# Patient Record
Sex: MALE | Race: WHITE | NOT HISPANIC OR LATINO | Employment: FULL TIME | URBAN - METROPOLITAN AREA
[De-identification: names, ages, dates, MRNs, and addresses within clinical notes are randomized per-mention and may not be internally consistent; named-entity substitution may affect disease eponyms.]

---

## 2018-02-06 ENCOUNTER — TELEPHONE (OUTPATIENT)
Dept: CARDIOLOGY | Facility: MEDICAL CENTER | Age: 68
End: 2018-02-06

## 2018-02-06 ENCOUNTER — TELEPHONE (OUTPATIENT)
Dept: CARDIOLOGY | Facility: PHYSICIAN GROUP | Age: 68
End: 2018-02-06

## 2018-02-06 DIAGNOSIS — Z95.1 S/P CABG X 3: Chronic | ICD-10-CM

## 2018-02-06 NOTE — TELEPHONE ENCOUNTER
I spoke with Pts son today in an appointment, they would both benefit from ICR.  Please call the patient and let him know he has a referral, he should also come in for follow up yearly if he would like    Thank you

## 2018-02-06 NOTE — TELEPHONE ENCOUNTER
Deon Gibson M.D. 8 minutes ago (2:53 PM)   Patient's wife taking his calls given this information.     I spoke with Pts son today in an appointment, they would both benefit from ICR.  Please call the patient and let him know he has a referral, he should also come in for follow up yearly if he would like     Thank you            Documentation

## 2018-03-22 ENCOUNTER — HOSPITAL ENCOUNTER (OUTPATIENT)
Dept: HOSPITAL 8 - ROC | Age: 68
End: 2018-03-22
Attending: RADIOLOGY
Payer: COMMERCIAL

## 2018-03-22 DIAGNOSIS — Z02.9: Primary | ICD-10-CM

## 2018-03-26 ENCOUNTER — HOSPITAL ENCOUNTER (OUTPATIENT)
Dept: HOSPITAL 8 - ROC | Age: 68
Discharge: HOME | End: 2018-03-26
Attending: RADIOLOGY
Payer: COMMERCIAL

## 2018-03-26 DIAGNOSIS — C02.9: Primary | ICD-10-CM

## 2018-03-26 PROCEDURE — G0463 HOSPITAL OUTPT CLINIC VISIT: HCPCS

## 2018-03-26 PROCEDURE — 99204 OFFICE O/P NEW MOD 45 MIN: CPT

## 2018-04-05 ENCOUNTER — HOSPITAL ENCOUNTER (OUTPATIENT)
Dept: HOSPITAL 8 - PETCFH | Age: 68
Discharge: HOME | End: 2018-04-05
Attending: RADIOLOGY
Payer: COMMERCIAL

## 2018-04-05 DIAGNOSIS — C02.2: Primary | ICD-10-CM

## 2018-04-05 PROCEDURE — A9552 F18 FDG: HCPCS

## 2018-04-05 PROCEDURE — 78815 PET IMAGE W/CT SKULL-THIGH: CPT

## 2019-03-18 ENCOUNTER — OFFICE VISIT (OUTPATIENT)
Dept: CARDIOLOGY | Facility: MEDICAL CENTER | Age: 69
End: 2019-03-18
Payer: COMMERCIAL

## 2019-03-18 VITALS
DIASTOLIC BLOOD PRESSURE: 72 MMHG | WEIGHT: 294 LBS | HEIGHT: 72 IN | BODY MASS INDEX: 39.82 KG/M2 | SYSTOLIC BLOOD PRESSURE: 140 MMHG | OXYGEN SATURATION: 92 % | HEART RATE: 56 BPM

## 2019-03-18 DIAGNOSIS — I25.10 CORONARY ARTERY DISEASE DUE TO CALCIFIED CORONARY LESION: ICD-10-CM

## 2019-03-18 DIAGNOSIS — I44.7 LBBB (LEFT BUNDLE BRANCH BLOCK): Chronic | ICD-10-CM

## 2019-03-18 DIAGNOSIS — Z95.1 S/P CABG X 3: Chronic | ICD-10-CM

## 2019-03-18 DIAGNOSIS — R00.2 PALPITATIONS: ICD-10-CM

## 2019-03-18 DIAGNOSIS — I10 ESSENTIAL HYPERTENSION: ICD-10-CM

## 2019-03-18 DIAGNOSIS — E78.5 DYSLIPIDEMIA: Chronic | ICD-10-CM

## 2019-03-18 DIAGNOSIS — I48.0 PAROXYSMAL ATRIAL FIBRILLATION (HCC): Chronic | ICD-10-CM

## 2019-03-18 DIAGNOSIS — I25.84 CORONARY ARTERY DISEASE DUE TO CALCIFIED CORONARY LESION: ICD-10-CM

## 2019-03-18 PROCEDURE — 99214 OFFICE O/P EST MOD 30 MIN: CPT | Performed by: INTERNAL MEDICINE

## 2019-03-18 RX ORDER — HYDROCODONE BITARTRATE AND ACETAMINOPHEN 5; 325 MG/1; MG/1
TABLET ORAL
COMMUNITY
Start: 2018-02-12 | End: 2019-03-18

## 2019-03-18 RX ORDER — ASPIRIN 81 MG/1
81 TABLET ORAL DAILY
COMMUNITY
Start: 2016-05-19 | End: 2022-06-01

## 2019-03-18 RX ORDER — CEFDINIR 300 MG/1
CAPSULE ORAL
COMMUNITY
Start: 2018-02-11 | End: 2019-03-18

## 2019-03-18 ASSESSMENT — ENCOUNTER SYMPTOMS
SORE THROAT: 0
PALPITATIONS: 0
PND: 0
FALLS: 0
BLURRED VISION: 0
SHORTNESS OF BREATH: 0
CLAUDICATION: 0
FOCAL WEAKNESS: 0
NAUSEA: 0
BRUISES/BLEEDS EASILY: 0
COUGH: 0
CHILLS: 0
FEVER: 0
DIZZINESS: 0
WEAKNESS: 0
ABDOMINAL PAIN: 0

## 2019-03-18 NOTE — LETTER
March 18, 2019       Patient: Ramon Aevry   YOB: 1950   Date of Visit: 3/18/2019         To Whom It May Concern:    It is my medical opinion that Ramon Avery return to full duty, no restrictions.    If you have any questions or concerns, please don't hesitate to call [phone]          Sincerely,          Deon Gibson M.D.  Electronically Signed

## 2019-03-18 NOTE — PATIENT INSTRUCTIONS
Please look into the following diets and incorporate them into your diet    LOW SALT DIET   KEEP YOUR SODIUM EQUAL TO CALORIES AND NO MORE THAN DOUBLE THE CALORIES FOR A LOW SALT DIET    FOR TREATMENT OR PREVENTION OF CORONARY ARTERY DISEASE    [place] - [facility]    Dr[facility] for Reversing Heart Disease - Wilian Arevalo's Cardiologist    [facility]    Dr Barnes - [place] [facility] (book and documentary)      FOR TREATMENT OF BLOOD PRESSURE  DASH DIET - American Heart Association for treatment of HYPERTENSION    FOR TREATMENT OF BAD CHOLESTEROL/FATS  REDUCE PROCESSED SUGAR AS MUCH AS POSSIBLE  INCREASE WHOLE GRAINS/VEGETABLES    Lowering total cholesterol and LDL (bad) cholesterol:  - Eat leaner cuts of meat, or eliminate altogether if possible red meat, and frequently substitute fish or chicken.  - Limit saturated fat to no more than 7-10% of total calories no more than 10 g per day is recommended. Some sources of saturated fat include butter, animal fats, hydrogenated vegetable fats and oils, many desserts, whole milk dairy products.  - Replaced saturated fats with polyunsaturated fats and monounsaturated fats. Foods high in monounsaturated fat include nuts, although well, canola oil, avocados, and olives.  - Limit trans fat (processed foods) and replaced with fresh fruits and vegetables  - Recommend nonfat dairy products  - Increase substantially the amount of soluble fiber intake (legumes such as beans, fruit, whole grains).  - Consider nutritional supplements: plant sterile spreads such as Benecol, fish oil,  flaxseed oil, omega-3 acids capsules 1000 mg twice a day, or viscous fiber such as Metamucil  - Attain ideal weight and regular exercise (at least 30 minutes per day of walking)    Lowering triglycerides:  - Reduce intake of simple sugar: Desserts, candy, pastries, honey, sodas, sugared cereals, yogurt, Gatorade, sports bars, canned  fruit, smoothies, fruit juice, coffee drinks  - Reduced intake of refined starches: Refined Pasta  - Reduce or abstain from alcohol  - Increase omega-3 fatty acids: [place], Trout, Mackerel, Herring, [place] tuna and supplements  - Attain ideal weight and regular exercise (at least 30 minutes per day of walking)      Elevating HDL (good) cholesterol:  - Increase physical activity  - Seasoned foods with garlic and onions  - Increase omega-3 fatty acids and supplements as listed above  - Incorporating appropriate amounts of monounsaturated fats such as nuts, olive oil, canola oil, avocados, olives  - Stop smoking  - Attain ideal weight and regular exercise (at least 30 minutes per day of walking)

## 2019-03-18 NOTE — PROGRESS NOTES
"Chief Complaint   Patient presents with   • Coronary Artery Bypass Graft (CABG)     Follow up   • Hypertension       Subjective:   Ramon Avery is a 68 y.o. male who presents today for follow-up of his history of CABG which was complicated by postoperative atrial fibrillation    He describes a couple episodes of near syncope one when he was shopping and while pushing the cart he felt that he was out of sorts for up to seconds and then this resolved he had another shorter episode after this    He has had issues in the past with his hearing aids    Past Medical History:   Diagnosis Date   • Anesthesia     \"difficult to put to sleep\"   • Anginal syndrome (HCC)     CAD   • Arthritis     upper back   • Dependence on supplemental oxygen    • Heart burn    • High cholesterol    • Hypertension    • Obesity    • Paroxysmal atrial fibrillation (HCC)    • Paroxysmal atrial fibrillation - postoperative from CABG    • S/P CABG x 3 5/2016 Dr Ryder for 3 vessel disease including left main aneurysm      Past Surgical History:   Procedure Laterality Date   • OTHER CARDIAC SURGERY  5/12/16    CABG   • MULTIPLE CORONARY ARTERY BYPASS ENDO VEIN HARVEST  5/12/2016    Procedure: MULTIPLE CORONARY ARTERY BYPASS X3,  ENDO VEIN HARVEST , SYLVIA AND PREVENA DRESSING ;  Surgeon: Bob Ryder M.D.;  Location: SURGERY [facility];  Service:    • RECOVERY  5/9/2016    Procedure: CATH LAB [facility] WITH POSSIBLE DR. HE;  Surgeon: [facility];  Location: SURGERY PRE-POST PROC UNIT [facility];  Service:    • OTHER  1990    Circumcision     History reviewed. No pertinent family history.  Social History     Social History   • Marital status:      Spouse name: N/A   • Number of children: N/A   • Years of education: N/A     Occupational History   • Not on file.     Social History Main Topics   • Smoking status: Never Smoker   • Smokeless tobacco: Never Used   • Alcohol use No   • Drug use: No   • Sexual activity: Not on file " "    Other Topics Concern   • Not on file     Social History Narrative   • No narrative on file     Allergies   Allergen Reactions   • [place] Swelling     Lip swelling. Not anaphylactic.     Outpatient Encounter Prescriptions as of 3/18/2019   Medication Sig Dispense Refill   • Lactobacillus (ACIDOPHILUS PROBIOTIC) Tab TK 1 T PO  QD FOR 14 DAYS     • MAGNESIUM PO Take 30 mg by mouth.     • aspirin 81 MG EC tablet Take 81 mg by mouth.     • [DISCONTINUED] cefdinir (OMNICEF) 300 MG Cap      • [DISCONTINUED] HYDROcodone-acetaminophen (NORCO) 5-325 MG Tab per tablet      • [DISCONTINUED] aspirin EC 81 MG EC tablet Take 1 Tab by mouth every day. 30 Tab      No facility-administered encounter medications on file as of 3/18/2019.      Review of Systems   Constitutional: Negative for chills and fever.   HENT: Negative for sore throat.    Eyes: Negative for blurred vision.   Respiratory: Negative for cough and shortness of breath.    Cardiovascular: Negative for chest pain, palpitations, claudication, leg swelling and PND.   Gastrointestinal: Negative for abdominal pain and nausea.   Musculoskeletal: Negative for falls and joint pain.   Skin: Negative for rash.   Neurological: Negative for dizziness, focal weakness and weakness.   Endo/Heme/Allergies: Does not bruise/bleed easily.        Objective:   /72 (BP Location: Right arm, Patient Position: Sitting)   Pulse (!) 56   Ht 1.829 m (6' 0.01\")   Wt (!) 133.4 kg (294 lb)   SpO2 92%   BMI 39.86 kg/m²     Physical Exam   Constitutional: No distress.   HENT:   Mouth/Throat: Oropharynx is clear and moist. No oropharyngeal exudate.   Eyes: No scleral icterus.   Neck: No JVD present.   Cardiovascular: Normal rate and normal heart sounds.  Exam reveals no gallop and no friction rub.    No murmur heard.  Pulmonary/Chest: No respiratory distress. He has no wheezes. He has no rales.   Abdominal: Soft. Bowel sounds are normal.   Musculoskeletal: He exhibits no edema. "   Neurological: He is alert.   Skin: No rash noted. He is not diaphoretic.   Psychiatric: He has a normal mood and affect.     We reviewed his labs from lab core which were normal aside from glucose of 106    Previous LDL was improved from prior but still with dyslipidemia  Assessment:     1. LBBB (left bundle branch block)     2. S/P CABG x 3 5/2016 Dr Ryder for 3 vessel disease including left main aneurysm     3. Essential hypertension     4. Dyslipidemia     5. Palpitations  Holter Monitor / Event Recorder   6. Paroxysmal atrial fibrillation - postoperative from CABG     7. Coronary artery disease due to calcified coronary lesion         Medical Decision Making:  Today's Assessment / Status / Plan:       It was my pleasure to meet with Mr. Avery.    He is accompanied by his son    Blood pressure is well controlled.      He declined statins or other blood pressure agents    For his episode of near syncope this certainly could be arrhythmia he did have postoperative atrial fibrillation so we will start with a longer-term event monitor to sort this out it could be also in her ear issue so he will follow-up with ENT    I will see Mr. Avery back in 1 year time and encouraged him to follow up with us over the phone or e-mail using my MyChart as issues arise.    It is my pleasure to participate in the care of Mr. Avery.  Please do not hesitate to contact me with questions or concerns.    Deon Gibson MD PhD [facility]  Cardiologist [facility]    Please note that this dictation was created using voice recognition software. I have worked with consultants from the vendor as well as technical experts from [facility] to optimize the interface. I have made every reasonable attempt to correct obvious errors, but I expect that there are errors of grammar and possibly content I did not discover before finalizing the note.

## 2019-03-18 NOTE — LETTER
"     [facility]   [address]   [address]  [place] [address]  Phone: [phone]  Fax: [phone]              Ramon Avery  1950    Encounter Date: 3/18/2019    Deon Gibson M.D.          PROGRESS NOTE:  Chief Complaint   Patient presents with   • Coronary Artery Bypass Graft (CABG)     Follow up   • Hypertension       Subjective:   Ramon Avery is a 68 y.o. male who presents today for follow-up of his history of CABG which was complicated by postoperative atrial fibrillation    He describes a couple episodes of near syncope one when he was shopping and while pushing the cart he felt that he was out of sorts for up to seconds and then this resolved he had another shorter episode after this    He has had issues in the past with his hearing aids    Past Medical History:   Diagnosis Date   • Anesthesia     \"difficult to put to sleep\"   • Anginal syndrome (HCC)     CAD   • Arthritis     upper back   • Dependence on supplemental oxygen    • Heart burn    • High cholesterol    • Hypertension    • Obesity    • Paroxysmal atrial fibrillation (HCC)    • Paroxysmal atrial fibrillation - postoperative from CABG    • S/P CABG x 3 5/2016 Dr Ryder for 3 vessel disease including left main aneurysm      Past Surgical History:   Procedure Laterality Date   • OTHER CARDIAC SURGERY  5/12/16    CABG   • MULTIPLE CORONARY ARTERY BYPASS ENDO VEIN HARVEST  5/12/2016    Procedure: MULTIPLE CORONARY ARTERY BYPASS X3,  ENDO VEIN HARVEST , SYLVIA AND PREVENA DRESSING ;  Surgeon: Bob Ryder M.D.;  Location: SURGERY [facility];  Service:    • RECOVERY  5/9/2016    Procedure: CATH LAB [facility] WITH POSSIBLE DR. HE;  Surgeon: [facility];  [place] [facility];  Service:    • OTHER  1990    Circumcision     History reviewed. No pertinent family history.  Social History     Social History   • Marital status:    " "Spouse name: N/A   • Number of children: N/A   • Years of education: N/A     Occupational History   • Not on file.     Social History Main Topics   • Smoking status: Never Smoker   • Smokeless tobacco: Never Used   • Alcohol use No   • Drug use: No   • Sexual activity: Not on file     Other Topics Concern   • Not on file     Social History Narrative   • No narrative on file     Allergies   Allergen Reactions   • [place] Swelling     Lip swelling. Not anaphylactic.     Outpatient Encounter Prescriptions as of 3/18/2019   Medication Sig Dispense Refill   • Lactobacillus (ACIDOPHILUS PROBIOTIC) Tab TK 1 T PO  QD FOR 14 DAYS     • MAGNESIUM PO Take 30 mg by mouth.     • aspirin 81 MG EC tablet Take 81 mg by mouth.     • [DISCONTINUED] cefdinir (OMNICEF) 300 MG Cap      • [DISCONTINUED] HYDROcodone-acetaminophen (NORCO) 5-325 MG Tab per tablet      • [DISCONTINUED] aspirin EC 81 MG EC tablet Take 1 Tab by mouth every day. 30 Tab      No facility-administered encounter medications on file as of 3/18/2019.      Review of Systems   Constitutional: Negative for chills and fever.   HENT: Negative for sore throat.    Eyes: Negative for blurred vision.   Respiratory: Negative for cough and shortness of breath.    Cardiovascular: Negative for chest pain, palpitations, claudication, leg swelling and PND.   Gastrointestinal: Negative for abdominal pain and nausea.   Musculoskeletal: Negative for falls and joint pain.   Skin: Negative for rash.   Neurological: Negative for dizziness, focal weakness and weakness.   Endo/Heme/Allergies: Does not bruise/bleed easily.        Objective:   /72 (BP Location: Right arm, Patient Position: Sitting)   Pulse (!) 56   Ht 1.829 m (6' 0.01\")   Wt (!) 133.4 kg (294 lb)   SpO2 92%   BMI 39.86 kg/m²      Physical Exam   Constitutional: No distress.   HENT:   Mouth/Throat: Oropharynx is clear and moist. No oropharyngeal exudate.   Eyes: No scleral icterus.   Neck: No JVD present.   "   Cardiovascular: Normal rate and normal heart sounds.  Exam reveals no gallop and no friction rub.    No murmur heard.  Pulmonary/Chest: No respiratory distress. He has no wheezes. He has no rales.   Abdominal: Soft. Bowel sounds are normal.   Musculoskeletal: He exhibits no edema.   Neurological: He is alert.   Skin: No rash noted. He is not diaphoretic.   Psychiatric: He has a normal mood and affect.     We reviewed his labs from lab core which were normal aside from glucose of 106    Previous LDL was improved from prior but still with dyslipidemia  Assessment:     1. LBBB (left bundle branch block)     2. S/P CABG x 3 5/2016 Dr Ryder for 3 vessel disease including left main aneurysm     3. Essential hypertension     4. Dyslipidemia     5. Palpitations  Holter Monitor / Event Recorder   6. Paroxysmal atrial fibrillation - postoperative from CABG     7. Coronary artery disease due to calcified coronary lesion         Medical Decision Making:  Today's Assessment / Status / Plan:       It was my pleasure to meet with Mr. Avery.    He is accompanied by his son    Blood pressure is well controlled.      He declined statins or other blood pressure agents    For his episode of near syncope this certainly could be arrhythmia he did have postoperative atrial fibrillation so we will start with a longer-term event monitor to sort this out it could be also in her ear issue so he will follow-up with ENT    I will see Mr. Avery back in 1 year time and encouraged him to follow up with us over the phone or e-mail using my MyChart as issues arise.    It is my pleasure to participate in the care of Mr. Avery.  Please do not hesitate to contact me with questions or concerns.    Deon Gibson MD PhD [facility]  Cardiologist [facility]    Please note that this dictation was created using voice recognition software. I have worked with consultants from the vendor as well as technical experts from  [facility] to optimize the interface. I have made every reasonable attempt to correct obvious errors, but I expect that there are errors of grammar and possibly content I did not discover before finalizing the note.         Jamey Erickson M.D.  [id number]  [place] [address]  VIA Facsimile: [phone]     Jeremy Dey M.D.  [address]  [place] [address]  VIA Facsimile: [phone]

## 2019-03-27 ENCOUNTER — TELEPHONE (OUTPATIENT)
Dept: CARDIOLOGY | Facility: MEDICAL CENTER | Age: 69
End: 2019-03-27

## 2019-03-27 ENCOUNTER — NON-PROVIDER VISIT (OUTPATIENT)
Dept: CARDIOLOGY | Facility: MEDICAL CENTER | Age: 69
End: 2019-03-27
Payer: COMMERCIAL

## 2019-03-27 DIAGNOSIS — R00.2 PALPITATIONS: ICD-10-CM

## 2019-03-27 DIAGNOSIS — I48.0 PAROXYSMAL ATRIAL FIBRILLATION (HCC): Chronic | ICD-10-CM

## 2019-03-29 ENCOUNTER — TELEPHONE (OUTPATIENT)
Dept: CARDIOLOGY | Facility: MEDICAL CENTER | Age: 69
End: 2019-03-29

## 2019-03-29 NOTE — TELEPHONE ENCOUNTER
"[facility] calling with abnormal results   Received: Today Message Contents   Kristin MINERPancho Marie R.N.          WILVER/Margaret Leiva at [facility] is calling with abnormal results. She can be reached at [phone], option 1.      Returned [facility] Call and spoke with Keisha.  She states patient sent over a transmission on 3/27/2019.    Pt sent over \"new onset a. Fib at 130bpm.  Pt states he was short of breath and dizzy. While doing light activity.\"  Instructed Keisha to fax unofficial tracings to [phone].      Called patient and he states, \"I was at [employer] walking around and I started feeling dizzy.  It went sort of dark and I weak in the legs.  I felt a tingling sensation from my butt to my feet.  I felt more short of breath.  I had it 2 minor times when I would bend over, but I feel great today.\"  He states no other concerns or questions at this time.  Reassurance given that update will be relayed to MD.  He verbalizes understanding and is appreciative of information given.    Received tracings from [facility].  Pt update relayed to MD.    "

## 2019-03-29 NOTE — TELEPHONE ENCOUNTER
His event monitor did show arrhythmia.      He will be recommended to take anticoagulation  Options for blood thinners    Warfarin plus blood testing    Eliquis 5 mg twice a day    Pradaxa 150 mg twice a day    Xarelto 20 mg once a day with food    And if he wanted to take a medicine to suppress the arrhythmias as long as he is taking anticoagulation he could take Multaq.  The more affordable options would require him to be observed in the hospital for sotalol admission.    He could discuss with the EP service as well if he wanted to.    Please let him know    Thank you

## 2019-04-02 NOTE — TELEPHONE ENCOUNTER
Called patient, spoke with wife, Darby, and reviewed MD recommendations.  She verbalizes understanding.  She states she will review recommendations with patient and will return this call with his decision.

## 2019-04-18 ENCOUNTER — TELEPHONE (OUTPATIENT)
Dept: CARDIOLOGY | Facility: MEDICAL CENTER | Age: 69
End: 2019-04-18

## 2019-04-26 ENCOUNTER — TELEPHONE (OUTPATIENT)
Dept: CARDIOLOGY | Facility: MEDICAL CENTER | Age: 69
End: 2019-04-26

## 2019-04-26 PROCEDURE — 93272 ECG/REVIEW INTERPRET ONLY: CPT | Performed by: INTERNAL MEDICINE

## 2022-05-23 ENCOUNTER — TELEPHONE (OUTPATIENT)
Dept: CARDIOLOGY | Facility: PHYSICIAN GROUP | Age: 72
End: 2022-05-23
Payer: COMMERCIAL

## 2022-05-23 NOTE — TELEPHONE ENCOUNTER
Pt in [place] with stroke and reduced ejection fraction     May need biV PPM    Please get records from Dr Nam Latif with [facility] [phone] he will see me in [place]

## 2022-05-24 NOTE — TELEPHONE ENCOUNTER
Per CW, all cardiac related records such as labs, EKG tracings/ imaging cardiac related were requested from [facility]. Fax: [phone], Phone: [phone]   Fax confirmation has been received and sent to scan through [employer].     Pending records.

## 2022-06-01 ENCOUNTER — OFFICE VISIT (OUTPATIENT)
Dept: CARDIOLOGY | Facility: MEDICAL CENTER | Age: 72
End: 2022-06-01
Payer: MEDICARE

## 2022-06-01 VITALS
OXYGEN SATURATION: 94 % | DIASTOLIC BLOOD PRESSURE: 76 MMHG | SYSTOLIC BLOOD PRESSURE: 142 MMHG | WEIGHT: 276.4 LBS | HEIGHT: 71 IN | RESPIRATION RATE: 16 BRPM | BODY MASS INDEX: 38.69 KG/M2 | HEART RATE: 79 BPM

## 2022-06-01 DIAGNOSIS — R73.03 PREDIABETES: ICD-10-CM

## 2022-06-01 DIAGNOSIS — I48.0 PAROXYSMAL ATRIAL FIBRILLATION (HCC): ICD-10-CM

## 2022-06-01 DIAGNOSIS — I44.7 LBBB (LEFT BUNDLE BRANCH BLOCK): ICD-10-CM

## 2022-06-01 DIAGNOSIS — I71.20 THORACIC AORTIC ANEURYSM WITHOUT RUPTURE (HCC): ICD-10-CM

## 2022-06-01 DIAGNOSIS — I63.9 CEREBROVASCULAR ACCIDENT (CVA), UNSPECIFIED MECHANISM (HCC): ICD-10-CM

## 2022-06-01 DIAGNOSIS — I10 ESSENTIAL HYPERTENSION: ICD-10-CM

## 2022-06-01 DIAGNOSIS — E78.5 HYPERLIPIDEMIA LDL GOAL <70: ICD-10-CM

## 2022-06-01 DIAGNOSIS — I44.0 FIRST DEGREE ATRIOVENTRICULAR BLOCK: ICD-10-CM

## 2022-06-01 DIAGNOSIS — I25.5 ISCHEMIC CARDIOMYOPATHY: ICD-10-CM

## 2022-06-01 DIAGNOSIS — Z79.899 ON POTASSIUM SPARING DIURETIC THERAPY: ICD-10-CM

## 2022-06-01 DIAGNOSIS — I49.1 ATRIAL PREMATURE COMPLEX: ICD-10-CM

## 2022-06-01 DIAGNOSIS — Z95.1 S/P CABG X 3: Primary | Chronic | ICD-10-CM

## 2022-06-01 DIAGNOSIS — R73.03 PRE-DIABETES: Chronic | ICD-10-CM

## 2022-06-01 LAB — EKG IMPRESSION: NORMAL

## 2022-06-01 PROCEDURE — 99205 OFFICE O/P NEW HI 60 MIN: CPT | Performed by: INTERNAL MEDICINE

## 2022-06-01 PROCEDURE — 93000 ELECTROCARDIOGRAM COMPLETE: CPT | Performed by: INTERNAL MEDICINE

## 2022-06-01 RX ORDER — ATORVASTATIN CALCIUM 40 MG/1
40 TABLET, FILM COATED ORAL NIGHTLY
COMMUNITY
Start: 2022-05-22 | End: 2022-06-01

## 2022-06-01 RX ORDER — SPIRONOLACTONE 25 MG/1
25 TABLET ORAL DAILY
Qty: 60 TABLET | Refills: 1 | Status: SHIPPED | OUTPATIENT
Start: 2022-06-01 | End: 2022-10-04 | Stop reason: SDUPTHER

## 2022-06-01 RX ORDER — CARVEDILOL 6.25 MG/1
6.25 TABLET ORAL 2 TIMES DAILY WITH MEALS
Qty: 120 TABLET | Refills: 1 | Status: SHIPPED | OUTPATIENT
Start: 2022-06-01 | End: 2022-06-10

## 2022-06-01 RX ORDER — ATORVASTATIN CALCIUM 40 MG/1
40 TABLET, FILM COATED ORAL NIGHTLY
Qty: 90 TABLET | Refills: 3 | Status: SHIPPED | OUTPATIENT
Start: 2022-06-01 | End: 2023-06-12

## 2022-06-01 RX ORDER — LOSARTAN POTASSIUM 25 MG/1
25 TABLET ORAL DAILY
COMMUNITY
Start: 2022-05-22 | End: 2022-06-01

## 2022-06-01 RX ORDER — LOSARTAN POTASSIUM 25 MG/1
25 TABLET ORAL DAILY
Qty: 90 TABLET | Refills: 1 | Status: SHIPPED | OUTPATIENT
Start: 2022-06-01 | End: 2022-09-12

## 2022-06-01 NOTE — PATIENT INSTRUCTIONS
- stop Aspirin  - check you blood pressure and pulse twice a day for 2 weeks send send you log to Dr. Gibson.  - start Coreg 6.25mg twice a day  - start spironolactone 25mg daily  - keep taking losartan 25mg daily  - blood test next Wednesday to check on your kidneys and potassium  - check morning daily standing weight

## 2022-06-01 NOTE — PROGRESS NOTES
CARDIOLOGY NEW PATIENT:    PCP: Tez Diallo M.D.    1. S/P CABG x 3 5/2016 Dr Ryder for 3 vessel disease including left main aneurysm    2. Paroxysmal atrial fibrillation - postoperative from CABG and then on event monitor 3/2019    3. LBBB (left bundle branch block)    4. First degree atrioventricular block    5. Pre-diabetes    6. Essential hypertension    7. Hyperlipidemia LDL goal <70    8. Cerebrovascular accident (CVA), unspecified mechanism (HCC)    9. Ischemic cardiomyopathy    10. Prediabetes    11. On potassium sparing diuretic therapy    12. Thoracic aortic aneurysm without rupture (HCC)    13. Atrial premature complex        Ramon Avery is here for follow-up.  He used to see Dr. Dany Gibson in our clinic.    Chief Complaint   Patient presents with   • Atrial Fibrillation     N/P Dx: Paroxysmal atrial fibrillation - postoperative from CABG and then on event monitor 3/2019   • Coronary Artery Bypass Graft (CABG)     N/P Dx: S/P CABG x 3 5/2016 Dr Ryder for 3 vessel disease including left main aneurysm     • Coronary Artery Disease     N/P Dx: Coronary artery disease due to calcified coronary lesion       History: Ramon Avery is a 71 y.o. male with history of CAD status post three-vessel CABG in 5/2016 dyslipidemia, prediabetes (A1C 5.8% 5/2022), ischemic CM EF 35% 2016 not on medical thearpy, hypertension, obesity, post CABG paroxysmal atrial fibrillation, is here for follow-up.    5/21/22 CVA (deficits resolved now), 2 days hospitalized, no lytics: [facility] in [place], [place] ([facility]). LDL was 95 and  during his hospital stay.    Reports weight 263lbs. Checks BP at home 1ce/week: usually 130/70's mmHg.    Reports bendopnea and some LH with bending over, otherwise no much CP or exertional SOB. Has stable GIGI, no orthopnea or PND. No syncope or presyncope. No claudication. Has chronic ED.    Accompanied with his son today.    Social:  Retired    Never  "smoked  Recently , son lives 200ft away  Stays active and eats healthy    Brain MRI 5/1/22:  A small focus of restricted diffusion is noted within the cortex of the right parietal lobe, suggestive of an acute infarction. Restricted diffusion is also noted within the right insular cortex and external capsule, also suggestive of an acute infarction.      TTE 5/21/22:  Moderate-to-severely dilated left ventricle.  LV ejection fraction is calculated at 25-30%.  Abnormal (paradoxical) motion consistent with left bundle branch block.  Severe global hypokinesis.  Mildly dilated left atrium.  Agitated saline bubble study performed with and without Valsalva, negative  for shunting.  Normal right ventricular size and systolic function.  Mild aortic root dilatation.  Ascending aorta measures 4.4 cm  Trace mitral insufficiency.  Spectral Doppler and Color Flow Velocity Mapping were used to interrogate  the valves and cardiac structures.  Signature  ----------------------------------------------------------------  Electronically signed by Melisa Man (Interpreting physician)  on 05/21/2022 at 12:54 PM  ----------------------------------------------------------------        PE:  BP (!) 142/76 (BP Location: Left arm, Patient Position: Sitting, BP Cuff Size: Adult)   Pulse 79   Resp 16   Ht 1.803 m (5' 11\")   Wt (!) 125 kg (276 lb 6.4 oz)   SpO2 94%   BMI 38.55 kg/m²     Gen: no acute distress  HEENT: Symmetric face. Anicteric sclerae. Moist mucus membranes  NECK: Jugular vein undetectable due to body habitus. No lymphadenopathy  CARDIAC: Regular, Normal S1, S2, +systolic murmur  VASCULATURE: carotids are normal bilaterally without bruit  RESP: Clear to auscultation bilaterally  ABD: Soft, non-tender, non-distended  EXT: 1+ lower extremity edema, no clubbing or cyanosis  SKIN: Warm and dry  NEURO: No gross deficits  PSYCH: Appropriate affect, participates in conversation    The ASCVD Risk score (Kristapallavi SIMMS Jr, et al., " "2013) failed to calculate.    Past Medical History:   Diagnosis Date   • Anesthesia     \"difficult to put to sleep\"   • Anginal syndrome (HCC)     CAD   • Arthritis     upper back   • Dependence on supplemental oxygen    • Heart burn    • High cholesterol    • Hypertension    • Obesity    • Paroxysmal atrial fibrillation (HCC)    • Paroxysmal atrial fibrillation - postoperative from CABG    • S/P CABG x 3 5/2016 Dr Ryder for 3 vessel disease including left main aneurysm      Past Surgical History:   Procedure Laterality Date   • OTHER CARDIAC SURGERY  5/12/16    CABG   • MULTIPLE CORONARY ARTERY BYPASS ENDO VEIN HARVEST  5/12/2016    Procedure: MULTIPLE CORONARY ARTERY BYPASS X3,  ENDO VEIN HARVEST , SYLVIA AND PREVENA DRESSING ;  Surgeon: Bob Ryder M.D.;  Location: SURGERY [facility];  Service:    • RECOVERY  5/9/2016    Procedure: CATH LAB [facility] WITH POSSIBLE DR. HE;  Surgeon: [facility];  Location: SURGERY PRE-POST PROC UNIT [facility];  Service:    • OTHER  1990    Circumcision     Allergies   Allergen Reactions   • [place] Swelling     Lip swelling. Not anaphylactic.     Outpatient Encounter Medications as of 6/1/2022   Medication Sig Dispense Refill   • carvedilol (COREG) 6.25 MG Tab Take 1 Tablet by mouth 2 times a day with meals. 120 Tablet 1   • spironolactone (ALDACTONE) 25 MG Tab Take 1 Tablet by mouth every day. 60 Tablet 1   • apixaban (ELIQUIS) 5mg Tab Take 1 Tablet by mouth 2 times a day. 120 Tablet 1   • atorvastatin (LIPITOR) 40 MG Tab Take 1 Tablet by mouth every evening. 90 Tablet 3   • losartan (COZAAR) 25 MG Tab Take 1 Tablet by mouth every day. 90 Tablet 1   • [DISCONTINUED] apixaban (ELIQUIS) 5mg Tab Take 1 Tablet by mouth 2 times a day.     • [DISCONTINUED] atorvastatin (LIPITOR) 40 MG Tab Take 40 mg by mouth every evening.     • [DISCONTINUED] losartan (COZAAR) 25 MG Tab Take 25 mg by mouth every day.     • [DISCONTINUED] apixaban (ELIQUIS) 5mg Tab Take 1 Tablet by mouth 2 " times a day. 120 Tablet 1   • [DISCONTINUED] Lactobacillus (ACIDOPHILUS PROBIOTIC) Tab TK 1 T PO  QD FOR 14 DAYS (Patient not taking: Reported on 6/1/2022)     • [DISCONTINUED] MAGNESIUM PO Take 30 mg by mouth. (Patient not taking: Reported on 6/1/2022)     • [DISCONTINUED] aspirin 81 MG EC tablet Take 81 mg by mouth every day.       No facility-administered encounter medications on file as of 6/1/2022.     Social History     Socioeconomic History   • Marital status:      Spouse name: Not on file   • Number of children: Not on file   • Years of education: Not on file   • Highest education level: Not on file   Occupational History   • Not on file   Tobacco Use   • Smoking status: Never Smoker   • Smokeless tobacco: Never Used   Substance and Sexual Activity   • Alcohol use: No   • Drug use: No   • Sexual activity: Not on file   Other Topics Concern   • Not on file   Social History Narrative   • Not on file     Social Determinants of Health     Financial Resource Strain: Not on file   Food Insecurity: Not on file   Transportation Needs: Not on file   Physical Activity: Not on file   Stress: Not on file   Social Connections: Not on file   Intimate Partner Violence: Not on file   Housing Stability: Not on file     Family History   Problem Relation Age of Onset   • Heart Disease Father          Studies    No results found for: TSHULTRASEN   No results found for: FREET4   Lab Results   Component Value Date/Time    HBA1C 5.8 (H) 05/21/2022 12:52 AM    HBA1C 6.4 (H) 05/11/2016 01:19 PM     No results found for: CHOLSTRLTOT, LDL, HDL, TRIGLYCERIDE    Lab Results   Component Value Date/Time    SODIUM 135 05/19/2016 01:45 AM    POTASSIUM 4.0 05/19/2016 01:45 AM    CHLORIDE 94 (L) 05/19/2016 01:45 AM    CO2 35 (H) 05/19/2016 01:45 AM    GLUCOSE 104 (H) 05/19/2016 01:45 AM    BUN 19 05/19/2016 01:45 AM    CREATININE 0.84 05/19/2016 01:45 AM     Lab Results   Component Value Date/Time    ALKPHOSPHAT 61 05/11/2016 01:19 PM     ASTSGOT 16 2016 01:19 PM    ALTSGPT 25 2016 01:19 PM    TBILIRUBIN 0.6 2016 01:19 PM        Echocardiogram:  No results found for this or any previous visit.    EC22  SINUS RHYTHM   ATRIAL PREMATURE COMPLEX   FIRST DEGREE AV BLOCK   LEFT BUNDLE BRANCH BLOCK QRS 176ms  Compared to ECG 05/15/2016 22:27:41   Atrial premature complex(es) now present   First degree AV block now present   Atrial fibrillation no longer present   Electronically Signed On 2022 9:00:51 PDT by Cecilio Weems MD       Assessment and Recommendations:    Problem List Items Addressed This Visit     LBBB (left bundle branch block) (Chronic)    Relevant Medications    carvedilol (COREG) 6.25 MG Tab    spironolactone (ALDACTONE) 25 MG Tab    apixaban (ELIQUIS) 5mg Tab    atorvastatin (LIPITOR) 40 MG Tab    losartan (COZAAR) 25 MG Tab    Pre-diabetes (Chronic)    S/P CABG x 3 2016 Dr Ryder for 3 vessel disease including left main aneurysm - Primary (Chronic)    Relevant Medications    atorvastatin (LIPITOR) 40 MG Tab    Paroxysmal atrial fibrillation - postoperative from CABG and then on event monitor 3/2019 (Chronic)    Relevant Medications    carvedilol (COREG) 6.25 MG Tab    spironolactone (ALDACTONE) 25 MG Tab    apixaban (ELIQUIS) 5mg Tab    atorvastatin (LIPITOR) 40 MG Tab    losartan (COZAAR) 25 MG Tab    Other Relevant Orders    EKG (Completed)    Essential hypertension    Relevant Medications    carvedilol (COREG) 6.25 MG Tab    spironolactone (ALDACTONE) 25 MG Tab    apixaban (ELIQUIS) 5mg Tab    atorvastatin (LIPITOR) 40 MG Tab    losartan (COZAAR) 25 MG Tab    First degree atrioventricular block    Relevant Medications    carvedilol (COREG) 6.25 MG Tab    spironolactone (ALDACTONE) 25 MG Tab    apixaban (ELIQUIS) 5mg Tab    atorvastatin (LIPITOR) 40 MG Tab    losartan (COZAAR) 25 MG Tab      Other Visit Diagnoses     Hyperlipidemia LDL goal <70        Relevant Medications    carvedilol (COREG) 6.25 MG Tab     spironolactone (ALDACTONE) 25 MG Tab    apixaban (ELIQUIS) 5mg Tab    atorvastatin (LIPITOR) 40 MG Tab    losartan (COZAAR) 25 MG Tab    Cerebrovascular accident (CVA), unspecified mechanism (HCC)        Relevant Medications    carvedilol (COREG) 6.25 MG Tab    spironolactone (ALDACTONE) 25 MG Tab    apixaban (ELIQUIS) 5mg Tab    atorvastatin (LIPITOR) 40 MG Tab    losartan (COZAAR) 25 MG Tab    Ischemic cardiomyopathy        Relevant Medications    carvedilol (COREG) 6.25 MG Tab    spironolactone (ALDACTONE) 25 MG Tab    apixaban (ELIQUIS) 5mg Tab    atorvastatin (LIPITOR) 40 MG Tab    losartan (COZAAR) 25 MG Tab    Other Relevant Orders    Basic Metabolic Panel    Prediabetes        On potassium sparing diuretic therapy        Relevant Orders    Basic Metabolic Panel    Thoracic aortic aneurysm without rupture (HCC)        Relevant Medications    carvedilol (COREG) 6.25 MG Tab    spironolactone (ALDACTONE) 25 MG Tab    apixaban (ELIQUIS) 5mg Tab    atorvastatin (LIPITOR) 40 MG Tab    losartan (COZAAR) 25 MG Tab    Atrial premature complex        Relevant Medications    carvedilol (COREG) 6.25 MG Tab    spironolactone (ALDACTONE) 25 MG Tab    apixaban (ELIQUIS) 5mg Tab    atorvastatin (LIPITOR) 40 MG Tab    losartan (COZAAR) 25 MG Tab        Fortunately, Mr. Avery is stable from a cardiovascular standpoint and his deficits from his recent CVA resolved.  I advised him on the importance of medication adherence especially for heart recovery in the setting of known ischemic cardiomyopathy and reduced LVEF.  We will try to optimize his medical regimen over the next 3 months before repeating an echocardiogram to reevaluate his LV ejection fraction.    I advised him to continue taking his losartan, apixaban and atorvastatin.  We will start carvedilol 6.25 mg twice a day and spironolactone 25 mg daily with the hopes to maximize his medical therapy for his ischemic cardiomyopathy and reduced ejection fraction before  deciding on CRT-D (QRS 176ms, LBBB).    The neck steps in his medical therapy will include switching his losartan to Entresto and starting SGLT2 inhibitor.      I also recommend obtaining a CTA of the chest/abdomen and pelvis to evaluate his reported TAA of 4.4 cm on the echocardiogram from his recent CVA hospitalization, to confirm the measurement so we will we can plan monitoring accordingly.  I will defer this to Dr. Gibsno's care in follow-up.      His target LDL should be less than 70, and target blood pressure should be < 120/80 mmHg.  He will need a repeat lipid panel in August 2022.  I advised him to stop aspirin since he is taking the Eliquis.  Given the above medication changes, I advised him to keep checking his blood pressures twice a day for 2 weeks and send us his log along with a repeat blood test next week to check on his kidneys and potassium.    I also advised him to keep track of his daily morning weights.    We discussed the importance of adherence to his blood thinners in the setting of his recent CVA which was assumed to be embolic in the setting of atrial fibrillation.  I was unable to find reports of atrial fibrillation in the records from his recent hospital stay.    We also discussed at length the importance of exercise and weight loss to optimize his ASCVD risk profile especially in the setting of known ischemic cardiomyopathy status post CABG.    I spent a total of 65 minutes reviewing his records, chart, and addressing all the above cardiovascular comorbidities in details with him and his son.    Thank you for the opportunity to be involved in Ramon Avery 's care; and please reach out with any questions or concerns.    Return in about 5 weeks (around 7/6/2022).    Cecilio Weems MD, MPH [facility]  Interventional Cardiologist  [facility]   of Clinical Internal Medicine - [facility] Terry CHOPRA    ~ Portions of this note were  completed using voice recognition software (Dragon Naturally speaking software) . Occasional transcription errors may have escaped proof reading. I have made every reasonable attempt to correct obvious errors, but I expect that there are errors of grammar and possibly content that I did not discover before finalizing the note. ~

## 2022-06-07 ENCOUNTER — HOSPITAL ENCOUNTER (OUTPATIENT)
Dept: LAB | Facility: MEDICAL CENTER | Age: 72
End: 2022-06-07
Attending: INTERNAL MEDICINE
Payer: MEDICARE

## 2022-06-07 DIAGNOSIS — Z79.899 ON POTASSIUM SPARING DIURETIC THERAPY: ICD-10-CM

## 2022-06-07 DIAGNOSIS — I25.5 ISCHEMIC CARDIOMYOPATHY: ICD-10-CM

## 2022-06-07 LAB
ANION GAP SERPL CALC-SCNC: 10 MMOL/L (ref 7–16)
BUN SERPL-MCNC: 16 MG/DL (ref 8–22)
CALCIUM SERPL-MCNC: 9.4 MG/DL (ref 8.5–10.5)
CHLORIDE SERPL-SCNC: 104 MMOL/L (ref 96–112)
CO2 SERPL-SCNC: 26 MMOL/L (ref 20–33)
CREAT SERPL-MCNC: 0.89 MG/DL (ref 0.5–1.4)
GFR SERPLBLD CREATININE-BSD FMLA CKD-EPI: 92 ML/MIN/1.73 M 2
GLUCOSE SERPL-MCNC: 115 MG/DL (ref 65–99)
POTASSIUM SERPL-SCNC: 4.5 MMOL/L (ref 3.6–5.5)
SODIUM SERPL-SCNC: 140 MMOL/L (ref 135–145)

## 2022-06-07 PROCEDURE — 36415 COLL VENOUS BLD VENIPUNCTURE: CPT

## 2022-06-07 PROCEDURE — 80048 BASIC METABOLIC PNL TOTAL CA: CPT

## 2022-06-10 ENCOUNTER — TELEPHONE (OUTPATIENT)
Dept: CARDIOLOGY | Facility: MEDICAL CENTER | Age: 72
End: 2022-06-10
Payer: MEDICARE

## 2022-06-10 DIAGNOSIS — I25.5 ISCHEMIC CARDIOMYOPATHY: ICD-10-CM

## 2022-06-10 RX ORDER — CARVEDILOL 3.12 MG/1
3.12 TABLET ORAL 2 TIMES DAILY WITH MEALS
Qty: 6 TABLET | Refills: 1 | Status: SHIPPED | OUTPATIENT
Start: 2022-06-10 | End: 2022-06-10

## 2022-06-10 RX ORDER — CARVEDILOL 3.12 MG/1
3.12 TABLET ORAL 2 TIMES DAILY WITH MEALS
Qty: 180 TABLET | Refills: 0 | Status: SHIPPED | OUTPATIENT
Start: 2022-06-10 | End: 2022-09-23 | Stop reason: SDUPTHER

## 2022-06-10 NOTE — TELEPHONE ENCOUNTER
AL    Caller: [email or web address] from [facility]  Topic/issue:Pharmacy needs clarification on carvedilol (COREG) 3.125 MG Tab       Callback Number: [phone]    Thank you,  Negrita DO

## 2022-06-10 NOTE — TELEPHONE ENCOUNTER
Called pharmacy and spoke with Analisa. RX clarified as 6 tablets were sent in, advised this is a typo meant to be 60 tablets. Pt FV with CW on 08/22/22 and phone in for RX for 90 day supply completed so pt will have enough medication until his next appointment. Read back order appropriately.

## 2022-06-18 ENCOUNTER — PATIENT MESSAGE (OUTPATIENT)
Dept: CARDIOLOGY | Facility: MEDICAL CENTER | Age: 72
End: 2022-06-18
Payer: MEDICARE

## 2022-06-20 ENCOUNTER — TELEPHONE (OUTPATIENT)
Dept: CARDIOLOGY | Facility: MEDICAL CENTER | Age: 72
End: 2022-06-20
Payer: MEDICARE

## 2022-06-20 ENCOUNTER — PATIENT MESSAGE (OUTPATIENT)
Dept: CARDIOLOGY | Facility: MEDICAL CENTER | Age: 72
End: 2022-06-20
Payer: MEDICARE

## 2022-06-20 VITALS — SYSTOLIC BLOOD PRESSURE: 121 MMHG | HEART RATE: 55 BPM | DIASTOLIC BLOOD PRESSURE: 61 MMHG

## 2022-06-21 ENCOUNTER — PATIENT MESSAGE (OUTPATIENT)
Dept: CARDIOLOGY | Facility: MEDICAL CENTER | Age: 72
End: 2022-06-21
Payer: MEDICARE

## 2022-08-22 ENCOUNTER — OFFICE VISIT (OUTPATIENT)
Dept: CARDIOLOGY | Facility: PHYSICIAN GROUP | Age: 72
End: 2022-08-22
Payer: MEDICARE

## 2022-08-22 VITALS
BODY MASS INDEX: 37.52 KG/M2 | OXYGEN SATURATION: 97 % | RESPIRATION RATE: 16 BRPM | HEART RATE: 54 BPM | SYSTOLIC BLOOD PRESSURE: 120 MMHG | DIASTOLIC BLOOD PRESSURE: 62 MMHG | HEIGHT: 71 IN | WEIGHT: 268 LBS

## 2022-08-22 DIAGNOSIS — I48.0 PAROXYSMAL ATRIAL FIBRILLATION (HCC): Chronic | ICD-10-CM

## 2022-08-22 DIAGNOSIS — I50.20 HFREF (HEART FAILURE WITH REDUCED EJECTION FRACTION) (HCC): ICD-10-CM

## 2022-08-22 DIAGNOSIS — I63.9 CEREBROVASCULAR ACCIDENT (CVA), UNSPECIFIED MECHANISM (HCC): ICD-10-CM

## 2022-08-22 DIAGNOSIS — I25.10 CORONARY ARTERY DISEASE DUE TO CALCIFIED CORONARY LESION: ICD-10-CM

## 2022-08-22 DIAGNOSIS — I25.84 CORONARY ARTERY DISEASE DUE TO CALCIFIED CORONARY LESION: ICD-10-CM

## 2022-08-22 DIAGNOSIS — E78.5 DYSLIPIDEMIA: Chronic | ICD-10-CM

## 2022-08-22 DIAGNOSIS — Z95.1 S/P CABG X 3: Chronic | ICD-10-CM

## 2022-08-22 DIAGNOSIS — I44.7 LBBB (LEFT BUNDLE BRANCH BLOCK): Chronic | ICD-10-CM

## 2022-08-22 PROBLEM — E11.9 TYPE 2 DIABETES MELLITUS WITHOUT COMPLICATION, WITHOUT LONG-TERM CURRENT USE OF INSULIN (HCC): Status: ACTIVE | Noted: 2017-12-07

## 2022-08-22 PROCEDURE — 99214 OFFICE O/P EST MOD 30 MIN: CPT | Performed by: INTERNAL MEDICINE

## 2022-08-24 NOTE — PROGRESS NOTES
"Chief Complaint   Patient presents with    Hypertension     F/V Dx: Essential hypertension    Coronary Artery Disease     F/V Dx: Coronary artery disease due to calcified coronary lesion    Coronary Artery Bypass Graft (CABG)     F/V Dx: S/P CABG x 3 5/2016 Dr Ryder for 3 vessel disease including left main aneurysm       Subjective     Ramon Avery is a 72 y.o. male who presents today for follow-up of his history of coronary status post CABG with postoperative atrial fibrillation    He is done well he last saw me 3 years ago he establish care earlier this year    Past Medical History:   Diagnosis Date    Anesthesia     \"difficult to put to sleep\"    Anginal syndrome (HCC)     CAD    Arthritis     upper back    Dependence on supplemental oxygen     Heart burn     High cholesterol     Hypertension     Obesity     Paroxysmal atrial fibrillation (HCC)     Paroxysmal atrial fibrillation - postoperative from CABG     S/P CABG x 3 5/2016 Dr Ryder for 3 vessel disease including left main aneurysm      Past Surgical History:   Procedure Laterality Date    OTHER CARDIAC SURGERY  5/12/16    CABG    MULTIPLE CORONARY ARTERY BYPASS ENDO VEIN HARVEST  5/12/2016    Procedure: MULTIPLE CORONARY ARTERY BYPASS X3,  ENDO VEIN HARVEST , SYLVIA AND PREVENA DRESSING ;  Surgeon: Bob Ryder M.D.;  Location: SURGERY [facility];  Service:     RECOVERY  5/9/2016    Procedure: CATH LAB [facility] WITH POSSIBLE DR. HE;  Surgeon: [facility];  Location[place] [facility];  Service:     OTHER  1990    Circumcision     Family History   Problem Relation Age of Onset    Heart Disease Father      Social History     Socioeconomic History    Marital status:      Spouse name: Not on file    Number of children: Not on file    Years of education: Not on file    Highest education level: Not on file   Occupational History    Not on file   Tobacco Use    Smoking status: Never    Smokeless tobacco: Never   Substance " "and Sexual Activity    Alcohol use: No    Drug use: No    Sexual activity: Not on file   Other Topics Concern    Not on file   Social History Narrative    Not on file     Social Determinants of Health     Financial Resource Strain: Not on file   Food Insecurity: Not on file   Transportation Needs: Not on file   Physical Activity: Not on file   Stress: Not on file   Social Connections: Not on file   Intimate Partner Violence: Not on file   Housing Stability: Not on file     Allergies   Allergen Reactions    [place] Swelling     Lip swelling. Not anaphylactic.     Outpatient Encounter Medications as of 8/22/2022   Medication Sig Dispense Refill    NON SPECIFIED Indications: Herbal supplement for weight management      apixaban (ELIQUIS) 5mg Tab Take 1 Tablet by mouth 2 times a day. 180 Tablet 3    carvedilol (COREG) 3.125 MG Tab Take 1 Tablet by mouth 2 times a day with meals. 180 Tablet 0    spironolactone (ALDACTONE) 25 MG Tab Take 1 Tablet by mouth every day. 60 Tablet 1    atorvastatin (LIPITOR) 40 MG Tab Take 1 Tablet by mouth every evening. 90 Tablet 3    losartan (COZAAR) 25 MG Tab Take 1 Tablet by mouth every day. 90 Tablet 1    [DISCONTINUED] apixaban (ELIQUIS) 5mg Tab Take 1 Tablet by mouth 2 times a day. 120 Tablet 1     No facility-administered encounter medications on file as of 8/22/2022.     ROS           Objective     /62 (BP Location: Left arm, Patient Position: Sitting, BP Cuff Size: Adult)   Pulse (!) 54   Resp 16   Ht 1.803 m (5' 11\")   Wt 122 kg (268 lb)   SpO2 97%   BMI 37.38 kg/m²     Physical Exam  Constitutional:       General: He is not in acute distress.     Appearance: He is not diaphoretic.   HENT:      Mouth/Throat:      Comments: Wearing a mask for COVID protocol  Eyes:      General: No scleral icterus.  Neck:      Vascular: No JVD.   Cardiovascular:      Rate and Rhythm: Normal rate.      Heart sounds: Normal heart sounds. No murmur heard.    No friction rub. No gallop. "   Pulmonary:      Effort: No respiratory distress.      Breath sounds: No wheezing or rales.   Abdominal:      General: Bowel sounds are normal.      Palpations: Abdomen is soft.   Musculoskeletal:      Right lower leg: No edema.      Left lower leg: No edema.   Skin:     Findings: No rash.   Neurological:      Mental Status: He is alert. Mental status is at baseline.   Psychiatric:         Mood and Affect: Mood normal.          We reviewed in person the most recent labs  Recent Results (from the past 5040 hour(s))   HEMOGLOBIN A1C    Collection Time: 22 12:52 AM   Result Value Ref Range    Glycohemoglobin 5.8 (H) 4.0 - 5.6 %    Est Avg Glucose 120 mg/dL mg/dL   EKG    Collection Time: 22  8:56 AM   Result Value Ref Range    Report       [facility]    Test Date:  2022  Pt Name:    BEBETO JIMENEZ                 Department: [facility]  MRN:        [id number]                      Room:  Gender:     Male                         Technician: DR WILLSB:        1950                   Requested By:ABIMAEL SALDANA  Order #:    [id number]                    Reading MD: Abimael Weems MD    Measurements  Intervals                                Axis  Rate:       74                           P:          0  NV:         220                          QRS:        265  QRSD:       176                          T:          95  QT:         492  QTc:        546    Interpretive Statements  SINUS RHYTHM  ATRIAL PREMATURE COMPLEX  FIRST DEGREE AV BLOCK  LEFT BUNDLE BRANCH BLOCK  Compared to ECG 05/15/2016 22:27:41  Atrial premature complex(es) now present  First degree AV block now present  Atrial fibrillation no longer present  Electronically Signed On 2022 9:00:51 PDT by Abimael Weems MD     Basic Metabolic Panel    Collection Time: 22  9:30 AM   Result Value Ref Range    Sodium 140 135 - 145 mmol/L    Potassium 4.5 3.6 - 5.5 mmol/L    Chloride 104 96 - 112 mmol/L    Co2 26 20 - 33 mmol/L    Glucose 115 (H)  65 - 99 mg/dL    Bun 16 8 - 22 mg/dL    Creatinine 0.89 0.50 - 1.40 mg/dL    Calcium 9.4 8.5 - 10.5 mg/dL    Anion Gap 10.0 7.0 - 16.0   ESTIMATED GFR    Collection Time: 06/07/22  9:30 AM   Result Value Ref Range    GFR (CKD-EPI) 92 >60 mL/min/1.73 m 2         Assessment & Plan     1. S/P CABG x 3 5/2016 Dr Ryder for 3 vessel disease including left main aneurysm  EC-ECHOCARDIOGRAM COMPLETE W/O CONT      2. Paroxysmal atrial fibrillation - postoperative from CABG and then on event monitor 3/2019  apixaban (ELIQUIS) 5mg Tab      3. LBBB (left bundle branch block)  EC-ECHOCARDIOGRAM COMPLETE W/O CONT      4. HFrEF (heart failure with reduced ejection fraction) (HCC)  EC-ECHOCARDIOGRAM COMPLETE W/O CONT      5. Dyslipidemia        6. Coronary artery disease due to calcified coronary lesion  EC-ECHOCARDIOGRAM COMPLETE W/O CONT      7. Cerebrovascular accident (CVA), unspecified mechanism (HCC)  apixaban (ELIQUIS) 5mg Tab          Medical Decision Making: Today's Assessment/Status/Plan:        It was my pleasure to meet with Mr. Avery.  We addressed the management of hypertension at today's visit. Blood pressure is well controlled.  We specifically assessed the labs on hypertension treatment  We addressed the management of dyslipidemia at today's visit. He is on appropriate statin.    We addressed the management of chronic anticoagulation at today's visit. He understands the risks and benefits of chronic anticoagulation.  We reviewed and verified the results of annual testing for anemia and kidney function.    Check echo    I will see Mr. Avery back in 1 year time and encouraged him to follow up with us over the phone or electronically using my MyChart as issues arise.    It is my pleasure to participate in the care of Mr. Avery.  Please do not hesitate to contact me with questions or concerns.    Deon Gibson MD PhD FAC  Cardiologist [facility]    Please note that this  dictation was created using voice recognition software. There may be errors I did not discover before finalizing the note.

## 2022-09-05 ENCOUNTER — PATIENT MESSAGE (OUTPATIENT)
Dept: CARDIOLOGY | Facility: PHYSICIAN GROUP | Age: 72
End: 2022-09-05
Payer: MEDICARE

## 2022-09-05 DIAGNOSIS — I50.20 HFREF (HEART FAILURE WITH REDUCED EJECTION FRACTION) (HCC): ICD-10-CM

## 2022-09-05 DIAGNOSIS — Z95.1 S/P CABG X 3: ICD-10-CM

## 2022-09-07 ENCOUNTER — PATIENT MESSAGE (OUTPATIENT)
Dept: CARDIOLOGY | Facility: PHYSICIAN GROUP | Age: 72
End: 2022-09-07
Payer: MEDICARE

## 2022-09-07 RX ORDER — EMPAGLIFLOZIN 10 MG/1
10 TABLET, FILM COATED ORAL DAILY
Qty: 90 TABLET | Refills: 3 | Status: SHIPPED | OUTPATIENT
Start: 2022-09-07 | End: 2023-08-03

## 2022-09-07 RX ORDER — SACUBITRIL AND VALSARTAN 24; 26 MG/1; MG/1
1 TABLET, FILM COATED ORAL 2 TIMES DAILY
Qty: 180 TABLET | Refills: 3 | Status: SHIPPED | OUTPATIENT
Start: 2022-09-07 | End: 2023-08-29 | Stop reason: SDUPTHER

## 2022-09-07 NOTE — PROGRESS NOTES
Ordered    Make sure he does nonfasting chem in a month and gets the following info on Jardiance    We discussed adding Jardiance (empagliflozin) 10 mg daily to your regimen to prevent complications from heart disease and help with blood sugar.  Based on large clinical trials Jardiance is expected to reduce your risk by 38% of heart attack or dying from heart disease if you have coronary disease and diabetes (EMPA-REG Study).  In patients without diabetes but who have heart failure, there is a 21-25% relatively less likelihood of dying or being hospitalized for heart failure (EMPEROR Study).  While Jardiance has benefits (most importantly reduce risk for hospitalization for heart failure or heart attack, weight loss, blood pressure control and reduces death) it also has risks due to the fact that you will be urinating excess sugar out of your body. There is increased risk for urinary and yeast infection, which can be very serious, so please report any symptoms as soon as possible to urgent care, primary care or our office.  You should stay well-hydrated on this medication and report abdominal pains.  You are expected to lose weight over time with this medication and may need to reduce your other medications.  It is advised to check your chemistries after starting this medication within the month and then regularly as is typically required for your other medications.    
Fall with Harm Risk

## 2022-09-10 ENCOUNTER — PATIENT MESSAGE (OUTPATIENT)
Dept: CARDIOLOGY | Facility: PHYSICIAN GROUP | Age: 72
End: 2022-09-10
Payer: MEDICARE

## 2022-09-12 ENCOUNTER — PATIENT MESSAGE (OUTPATIENT)
Dept: CARDIOLOGY | Facility: PHYSICIAN GROUP | Age: 72
End: 2022-09-12
Payer: MEDICARE

## 2022-09-12 NOTE — PATIENT COMMUNICATION
To CW, pt was prescribed Entresto and Losartan currently still active on MAR.  Ok to d/c Losartan?  Please advise, thank you

## 2022-09-13 ENCOUNTER — PATIENT MESSAGE (OUTPATIENT)
Dept: CARDIOLOGY | Facility: PHYSICIAN GROUP | Age: 72
End: 2022-09-13
Payer: MEDICARE

## 2022-09-14 DIAGNOSIS — I50.20 HFREF (HEART FAILURE WITH REDUCED EJECTION FRACTION) (HCC): ICD-10-CM

## 2022-09-14 DIAGNOSIS — Z95.1 S/P CABG X 3: ICD-10-CM

## 2022-09-22 ENCOUNTER — PATIENT MESSAGE (OUTPATIENT)
Dept: CARDIOLOGY | Facility: PHYSICIAN GROUP | Age: 72
End: 2022-09-22
Payer: MEDICARE

## 2022-09-23 DIAGNOSIS — I25.5 ISCHEMIC CARDIOMYOPATHY: ICD-10-CM

## 2022-09-23 RX ORDER — CARVEDILOL 3.12 MG/1
3.12 TABLET ORAL 2 TIMES DAILY WITH MEALS
Qty: 180 TABLET | Refills: 2 | Status: SHIPPED | OUTPATIENT
Start: 2022-09-23 | End: 2023-06-25

## 2022-09-30 DIAGNOSIS — I44.7 LBBB (LEFT BUNDLE BRANCH BLOCK): Chronic | ICD-10-CM

## 2022-09-30 DIAGNOSIS — I50.20 HFREF (HEART FAILURE WITH REDUCED EJECTION FRACTION) (HCC): ICD-10-CM

## 2022-09-30 DIAGNOSIS — I25.84 CORONARY ARTERY DISEASE DUE TO CALCIFIED CORONARY LESION: ICD-10-CM

## 2022-09-30 DIAGNOSIS — I25.10 CORONARY ARTERY DISEASE DUE TO CALCIFIED CORONARY LESION: ICD-10-CM

## 2022-09-30 DIAGNOSIS — Z95.1 S/P CABG X 3: Chronic | ICD-10-CM

## 2022-10-03 ENCOUNTER — TELEPHONE (OUTPATIENT)
Dept: CARDIOLOGY | Facility: MEDICAL CENTER | Age: 72
End: 2022-10-03
Payer: MEDICARE

## 2022-10-03 DIAGNOSIS — I25.5 ISCHEMIC CARDIOMYOPATHY: ICD-10-CM

## 2022-10-03 NOTE — TELEPHONE ENCOUNTER
CW    Caller: Ramon    Medication Name and Dosage:     spironolactone (ALDACTONE) 25 MG Tab    Did patient contact pharmacy (If no, please call pharmacy first): Yes    Medication amount left: 0    Preferred Pharmacy: [facility]    Other questions (Topic): Pt stated he ran out of this script over the weekend and pharmacy needs approval.    Callback Number (Will only call for issues): [phone]

## 2022-10-03 NOTE — TELEPHONE ENCOUNTER
To CW, please advise on what to say to pt regarding echo results.  FV scheduled with you 11/7/2022, thank you

## 2022-10-04 DIAGNOSIS — I25.5 ISCHEMIC CARDIOMYOPATHY: ICD-10-CM

## 2022-10-04 RX ORDER — SPIRONOLACTONE 25 MG/1
25 TABLET ORAL DAILY
Qty: 90 TABLET | Refills: 3 | Status: SHIPPED | OUTPATIENT
Start: 2022-10-04 | End: 2023-11-02 | Stop reason: SDUPTHER

## 2022-10-04 NOTE — TELEPHONE ENCOUNTER
CW       Caller: Ramon     Medication Name and Dosage:      spironolactone (ALDACTONE) 25 MG Tab     Did patient contact pharmacy (If no, please call pharmacy first): Yes     Medication amount left: 0 - Since Sat     Preferred Pharmacy: [facility]     Other questions (Topic): Pt stated he ran out of this script over the weekend and pharmacy needs approval.     Callback Number (Will only call for issues): [phone]

## 2022-10-05 NOTE — TELEPHONE ENCOUNTER
Is the patient due for a refill? No    Was the patient seen the past year? Yes    Date of last office visit: 8/22/22    Does the patient have an upcoming appointment?  Yes   If yes, When? 11/7/22    Provider to refill:CW    Does the patients insurance require a 100 day supply?  No

## 2022-10-10 RX ORDER — SPIRONOLACTONE 25 MG/1
25 TABLET ORAL DAILY
Qty: 90 TABLET | Refills: 3 | OUTPATIENT
Start: 2022-10-10

## 2022-10-22 DIAGNOSIS — I48.0 PAROXYSMAL ATRIAL FIBRILLATION (HCC): Chronic | ICD-10-CM

## 2022-10-22 DIAGNOSIS — I63.9 CEREBROVASCULAR ACCIDENT (CVA), UNSPECIFIED MECHANISM (HCC): ICD-10-CM

## 2022-10-24 NOTE — TELEPHONE ENCOUNTER
Is the patient due for a refill? No    Was the patient seen the past year? Yes    Date of last office visit: 8/22/22    Does the patient have an upcoming appointment?  Yes   If yes, When? 11/7/22    Provider to refill: CW     Does the patients insurance require a 100 day supply?  No    apixaban (ELIQUIS) 5mg Tab 180 Tablet 3/3 8/22/2022     Sig - Route: Take 1 Tablet by mouth 2 times a day. - Oral    Sent to pharmacy as: Apixaban 5 MG Oral Tablet (ELIQUIS)    E-Prescribing Status: Receipt confirmed by pharmacy (8/22/2022 10:20 AM PDT)

## 2022-10-25 RX ORDER — APIXABAN 5 MG/1
TABLET, FILM COATED ORAL
Qty: 180 TABLET | Refills: 3 | Status: SHIPPED | OUTPATIENT
Start: 2022-10-25 | End: 2023-11-02 | Stop reason: SDUPTHER

## 2022-11-07 ENCOUNTER — OFFICE VISIT (OUTPATIENT)
Dept: CARDIOLOGY | Facility: PHYSICIAN GROUP | Age: 72
End: 2022-11-07
Payer: MEDICARE

## 2022-11-07 VITALS
WEIGHT: 271.2 LBS | SYSTOLIC BLOOD PRESSURE: 112 MMHG | HEART RATE: 64 BPM | DIASTOLIC BLOOD PRESSURE: 60 MMHG | RESPIRATION RATE: 16 BRPM | OXYGEN SATURATION: 95 % | HEIGHT: 71 IN | BODY MASS INDEX: 37.97 KG/M2

## 2022-11-07 DIAGNOSIS — I48.0 PAROXYSMAL ATRIAL FIBRILLATION (HCC): Chronic | ICD-10-CM

## 2022-11-07 DIAGNOSIS — I25.84 CORONARY ARTERY DISEASE DUE TO CALCIFIED CORONARY LESION: ICD-10-CM

## 2022-11-07 DIAGNOSIS — I44.7 LBBB (LEFT BUNDLE BRANCH BLOCK): Chronic | ICD-10-CM

## 2022-11-07 DIAGNOSIS — E78.5 DYSLIPIDEMIA: Chronic | ICD-10-CM

## 2022-11-07 DIAGNOSIS — I50.20 HFREF (HEART FAILURE WITH REDUCED EJECTION FRACTION) (HCC): ICD-10-CM

## 2022-11-07 DIAGNOSIS — I25.10 CORONARY ARTERY DISEASE DUE TO CALCIFIED CORONARY LESION: ICD-10-CM

## 2022-11-07 DIAGNOSIS — Z95.1 S/P CABG X 3: Chronic | ICD-10-CM

## 2022-11-07 DIAGNOSIS — I10 ESSENTIAL HYPERTENSION: ICD-10-CM

## 2022-11-07 PROCEDURE — 99214 OFFICE O/P EST MOD 30 MIN: CPT | Performed by: INTERNAL MEDICINE

## 2022-11-07 NOTE — PROGRESS NOTES
"Chief Complaint   Patient presents with    Congestive Heart Failure     F/V Dx: HFrEF (heart failure with reduced ejection fraction) (HCC)    Coronary Artery Bypass Graft (CABG)     F/V Dx: S/P CABG x 3 5/2016 Dr Ryder for 3 vessel disease including left main aneurysm    Hypertension     F/V Dx: Essential hypertension       Subjective     Ramon Avery is a 72 y.o. male who presents today for follow-up of his history of CABG paroxysmal atrial fibrillation    We had advanced his heart failure regimen this is expensive hopefully becomes affordable we checked his EF which was 30 to 35%      Had a couple episodes of feeling unwell after working too hard      Past Medical History:   Diagnosis Date    Anesthesia     \"difficult to put to sleep\"    Anginal syndrome (HCC)     CAD    Arthritis     upper back    Dependence on supplemental oxygen     Heart burn     High cholesterol     Hypertension     Obesity     Paroxysmal atrial fibrillation (HCC)     Paroxysmal atrial fibrillation - postoperative from CABG     S/P CABG x 3 5/2016 Dr Ryder for 3 vessel disease including left main aneurysm      Past Surgical History:   Procedure Laterality Date    OTHER CARDIAC SURGERY  5/12/16    CABG    MULTIPLE CORONARY ARTERY BYPASS ENDO VEIN HARVEST  5/12/2016    Procedure: MULTIPLE CORONARY ARTERY BYPASS X3,  ENDO VEIN HARVEST , SYLVIA AND PREVENA DRESSING ;  Surgeon: Bob Ryder M.D.;  Location: SURGERY [facility];  Service:     RECOVERY  5/9/2016    Procedure: CATH LAB [facility] WITH POSSIBLE DR. HE;  Surgeon: [facility];  Location: SURGERY [facility];  Service:     OTHER  1990    Circumcision     Family History   Problem Relation Age of Onset    Heart Disease Father      Social History     Socioeconomic History    Marital status:      Spouse name: Not on file    Number of children: Not on file    Years of education: Not on file    Highest education level: Not on file   Occupational History    " "Not on file   Tobacco Use    Smoking status: Never    Smokeless tobacco: Never   Substance and Sexual Activity    Alcohol use: No    Drug use: No    Sexual activity: Not on file   Other Topics Concern    Not on file   Social History Narrative    Not on file     Social Determinants of Health     Financial Resource Strain: Not on file   Food Insecurity: Not on file   Transportation Needs: Not on file   Physical Activity: Not on file   Stress: Not on file   Social Connections: Not on file   Intimate Partner Violence: Not on file   Housing Stability: Not on file     Allergies   Allergen Reactions    [place] Swelling     Lip swelling. Not anaphylactic.     Outpatient Encounter Medications as of 11/7/2022   Medication Sig Dispense Refill    ELIQUIS 5 MG Tab TAKE 1 TABLET BY MOUTH TWICE DAILY 180 Tablet 3    spironolactone (ALDACTONE) 25 MG Tab Take 1 Tablet by mouth every day. 90 Tablet 3    carvedilol (COREG) 3.125 MG Tab Take 1 Tablet by mouth 2 times a day with meals. 180 Tablet 2    Empagliflozin (JARDIANCE) 10 MG Tab Take 1 Tablet by mouth every day. 90 Tablet 3    sacubitril-valsartan (ENTRESTO) 24-26 MG Tab Take 1 Tablet by mouth 2 times a day. 180 Tablet 3    NON SPECIFIED Indications: Herbal supplement for weight management      atorvastatin (LIPITOR) 40 MG Tab Take 1 Tablet by mouth every evening. 90 Tablet 3     No facility-administered encounter medications on file as of 11/7/2022.     ROS           Objective     /60 (BP Location: Left arm, Patient Position: Sitting, BP Cuff Size: Adult)   Pulse 64   Resp 16   Ht 1.803 m (5' 11\")   Wt 123 kg (271 lb 3.2 oz)   SpO2 95%   BMI 37.82 kg/m²     Physical Exam  Constitutional:       General: He is not in acute distress.     Appearance: He is not diaphoretic.   HENT:      Mouth/Throat:      Comments: Wearing a mask for COVID protocol  Eyes:      General: No scleral icterus.  Neck:      Vascular: No JVD.   Cardiovascular:      Rate and Rhythm: Normal rate. "      Heart sounds: Normal heart sounds. No murmur heard.    No friction rub. No gallop.   Pulmonary:      Effort: No respiratory distress.      Breath sounds: No wheezing or rales.   Abdominal:      General: Bowel sounds are normal.      Palpations: Abdomen is soft.   Musculoskeletal:      Right lower leg: No edema.      Left lower leg: No edema.   Skin:     Findings: No rash.   Neurological:      Mental Status: He is alert. Mental status is at baseline.   Psychiatric:         Mood and Affect: Mood normal.                  Assessment & Plan     1. HFrEF (heart failure with reduced ejection fraction) ([facility])        2. LBBB (left bundle branch block)        3. Paroxysmal atrial fibrillation - postoperative from CABG and then on event monitor 3/2019        4. S/P CABG x 3 5/2016 Dr Ryder for 3 vessel disease including left main aneurysm        5. Coronary artery disease due to calcified coronary lesion        6. Dyslipidemia        7. Essential hypertension            Medical Decision Making: Today's Assessment/Status/Plan:        It was my pleasure to meet with Mr. Avery.    We addressed the management of hypertension at today's visit. Blood pressure is well controlled.  We specifically assessed the labs on hypertension treatment    We addressed the management of dyslipidemia and atherosclerosis at today's visit. He is on appropriate statin.    We addressed the management of congestive heart failure with reduced ejection fraction .  He is on proper mineralacorticoid, angiotensin/ace inhibition with neprilysin inhibition, SGLTs inhibitor and beta-blockers as appropriate.  We addressed the potential side effects and regular laboratory follow-up for these medications. NOT ABLE TO TAKE MORE CARVEDILOL DUE TO BRADYCARDIA OR MORE ENTRESTO DUE TO ORTHOSTASIS    STRONGLY CONSIDER BIV ICD he is a     I will see Mr. Avery back in 1 year time and encouraged him to follow up with us over the phone or  electronically using my [employer] as issues arise.    It is my pleasure to participate in the care of Mr. Avery.  Please do not hesitate to contact me with questions or concerns.    Deon Gibson MD PhD [facility]  Cardiologist [facility]    Please note that this dictation was created using voice recognition software. There may be errors I did not discover before finalizing the note.

## 2023-05-05 ENCOUNTER — TELEPHONE (OUTPATIENT)
Dept: CARDIOLOGY | Facility: MEDICAL CENTER | Age: 73
End: 2023-05-05
Payer: MEDICARE

## 2023-05-05 ENCOUNTER — PATIENT MESSAGE (OUTPATIENT)
Dept: CARDIOLOGY | Facility: MEDICAL CENTER | Age: 73
End: 2023-05-05
Payer: MEDICARE

## 2023-05-05 ENCOUNTER — PATIENT MESSAGE (OUTPATIENT)
Dept: CARDIOLOGY | Facility: PHYSICIAN GROUP | Age: 73
End: 2023-05-05
Payer: MEDICARE

## 2023-05-05 NOTE — TELEPHONE ENCOUNTER
CW          Caller: Ramon Avery      Topic/issue: Patient was calling about the results of his Echo and if they were available and was asking for a call back about them      Callback Number: [phone]      Thank you    -Varun VEE

## 2023-05-09 NOTE — PATIENT COMMUNICATION
Records of latest echo has been requested from [facility]. Fax confirmation has been received and sent to scan through [facility].

## 2023-05-10 ENCOUNTER — PATIENT MESSAGE (OUTPATIENT)
Dept: CARDIOLOGY | Facility: PHYSICIAN GROUP | Age: 73
End: 2023-05-10
Payer: MEDICARE

## 2023-05-10 DIAGNOSIS — I50.20 HFREF (HEART FAILURE WITH REDUCED EJECTION FRACTION) (HCC): ICD-10-CM

## 2023-05-10 DIAGNOSIS — Z95.1 S/P CABG X 3: Chronic | ICD-10-CM

## 2023-05-10 DIAGNOSIS — I44.7 LBBB (LEFT BUNDLE BRANCH BLOCK): Chronic | ICD-10-CM

## 2023-06-10 DIAGNOSIS — I25.5 ISCHEMIC CARDIOMYOPATHY: ICD-10-CM

## 2023-06-10 DIAGNOSIS — Z95.1 S/P CABG X 3: Chronic | ICD-10-CM

## 2023-06-10 DIAGNOSIS — E78.5 HYPERLIPIDEMIA LDL GOAL <70: ICD-10-CM

## 2023-06-12 RX ORDER — ATORVASTATIN CALCIUM 40 MG/1
40 TABLET, FILM COATED ORAL NIGHTLY
Qty: 90 TABLET | Refills: 1 | Status: SHIPPED | OUTPATIENT
Start: 2023-06-12 | End: 2023-11-02 | Stop reason: SDUPTHER

## 2023-06-12 NOTE — TELEPHONE ENCOUNTER
Is the patient due for a refill? Yes    Was the patient seen the past year? Yes    Date of last office visit: 11/7/2022    Does the patient have an upcoming appointment?  Yes   If yes, When? 11/2/2023    Provider to refill:CW    Does the patients insurance require a 100 day supply?  No

## 2023-06-21 DIAGNOSIS — I25.5 ISCHEMIC CARDIOMYOPATHY: ICD-10-CM

## 2023-06-21 NOTE — TELEPHONE ENCOUNTER
Is the patient due for a refill? Yes    Was the patient seen the past year? Yes    Date of last office visit: 11/7/2022    Does the patient have an upcoming appointment?  Yes   If yes, When? 11/2/2023    Provider to refill:WILVER NGUYEN out of office    Does the patients insurance require a 100 day supply?  No

## 2023-06-25 RX ORDER — CARVEDILOL 3.12 MG/1
TABLET ORAL
Qty: 180 TABLET | Refills: 0 | Status: SHIPPED | OUTPATIENT
Start: 2023-06-25 | End: 2023-09-08

## 2023-07-28 DIAGNOSIS — I50.20 HFREF (HEART FAILURE WITH REDUCED EJECTION FRACTION) (HCC): ICD-10-CM

## 2023-07-28 DIAGNOSIS — E78.5 HYPERLIPIDEMIA LDL GOAL <70: ICD-10-CM

## 2023-08-03 DIAGNOSIS — I25.84 CORONARY ARTERY DISEASE DUE TO CALCIFIED CORONARY LESION: ICD-10-CM

## 2023-08-03 DIAGNOSIS — I25.10 CORONARY ARTERY DISEASE DUE TO CALCIFIED CORONARY LESION: ICD-10-CM

## 2023-08-03 RX ORDER — EMPAGLIFLOZIN 10 MG/1
10 TABLET, FILM COATED ORAL DAILY
Qty: 90 TABLET | Refills: 0 | Status: SHIPPED | OUTPATIENT
Start: 2023-08-03 | End: 2023-11-02 | Stop reason: SDUPTHER

## 2023-08-29 DIAGNOSIS — I25.84 CORONARY ARTERY DISEASE DUE TO CALCIFIED CORONARY LESION: ICD-10-CM

## 2023-08-29 DIAGNOSIS — I25.10 CORONARY ARTERY DISEASE DUE TO CALCIFIED CORONARY LESION: ICD-10-CM

## 2023-08-30 RX ORDER — SACUBITRIL AND VALSARTAN 24; 26 MG/1; MG/1
1 TABLET, FILM COATED ORAL 2 TIMES DAILY
Qty: 180 TABLET | Refills: 0 | Status: SHIPPED | OUTPATIENT
Start: 2023-08-30 | End: 2023-10-17 | Stop reason: SDUPTHER

## 2023-09-06 DIAGNOSIS — I25.5 ISCHEMIC CARDIOMYOPATHY: ICD-10-CM

## 2023-09-08 RX ORDER — CARVEDILOL 3.12 MG/1
TABLET ORAL
Qty: 180 TABLET | Refills: 0 | Status: SHIPPED | OUTPATIENT
Start: 2023-09-08 | End: 2023-11-02 | Stop reason: SDUPTHER

## 2023-10-17 ENCOUNTER — TELEPHONE (OUTPATIENT)
Dept: CARDIOLOGY | Facility: MEDICAL CENTER | Age: 73
End: 2023-10-17
Payer: MEDICARE

## 2023-10-17 DIAGNOSIS — I25.84 CORONARY ARTERY DISEASE DUE TO CALCIFIED CORONARY LESION: ICD-10-CM

## 2023-10-17 DIAGNOSIS — Z51.81 ENCOUNTER FOR MONITORING DIURETIC THERAPY: ICD-10-CM

## 2023-10-17 DIAGNOSIS — I50.20 HFREF (HEART FAILURE WITH REDUCED EJECTION FRACTION) (HCC): ICD-10-CM

## 2023-10-17 DIAGNOSIS — I25.10 CORONARY ARTERY DISEASE DUE TO CALCIFIED CORONARY LESION: ICD-10-CM

## 2023-10-17 DIAGNOSIS — Z79.899 ENCOUNTER FOR MONITORING DIURETIC THERAPY: ICD-10-CM

## 2023-10-17 RX ORDER — SACUBITRIL AND VALSARTAN 24; 26 MG/1; MG/1
1 TABLET, FILM COATED ORAL 2 TIMES DAILY
Qty: 60 TABLET | Refills: 0 | Status: SHIPPED | OUTPATIENT
Start: 2023-10-17 | End: 2023-11-02 | Stop reason: SDUPTHER

## 2023-10-17 NOTE — TELEPHONE ENCOUNTER
CW    Caller:  -Ramon    Medication Name and Dosage:    sacubitril-valsartan (ENTRESTO) 24-26 MG Tab [[id number]]    Medication amount left: 2    Preferred Pharmacy:   [facility]  [place], [place]    Other questions (Topic):   [place]amon [place] work, and needs script to go here.     Callback Number (Will only call for issues):   [phone]    Thank you,   Chiquita GRANGER

## 2023-10-17 NOTE — TELEPHONE ENCOUNTER
Medication sent to preferred pharmacy as requested.   Lab ordered, lab slip printed, and mailed to pt mailing address per protocol

## 2023-11-02 ENCOUNTER — OFFICE VISIT (OUTPATIENT)
Dept: CARDIOLOGY | Facility: PHYSICIAN GROUP | Age: 73
End: 2023-11-02
Payer: MEDICARE

## 2023-11-02 VITALS
RESPIRATION RATE: 15 BRPM | HEART RATE: 60 BPM | WEIGHT: 274 LBS | DIASTOLIC BLOOD PRESSURE: 52 MMHG | BODY MASS INDEX: 38.36 KG/M2 | SYSTOLIC BLOOD PRESSURE: 116 MMHG | HEIGHT: 71 IN | OXYGEN SATURATION: 93 %

## 2023-11-02 DIAGNOSIS — E78.5 DYSLIPIDEMIA: Chronic | ICD-10-CM

## 2023-11-02 DIAGNOSIS — I44.0 FIRST DEGREE ATRIOVENTRICULAR BLOCK: ICD-10-CM

## 2023-11-02 DIAGNOSIS — I48.0 HYPERCOAGULABLE STATE DUE TO PAROXYSMAL ATRIAL FIBRILLATION (HCC): ICD-10-CM

## 2023-11-02 DIAGNOSIS — Z95.1 S/P CABG X 3: Chronic | ICD-10-CM

## 2023-11-02 DIAGNOSIS — I25.10 CORONARY ARTERY DISEASE DUE TO CALCIFIED CORONARY LESION: ICD-10-CM

## 2023-11-02 DIAGNOSIS — I25.84 CORONARY ARTERY DISEASE DUE TO CALCIFIED CORONARY LESION: ICD-10-CM

## 2023-11-02 DIAGNOSIS — I25.5 ISCHEMIC CARDIOMYOPATHY: ICD-10-CM

## 2023-11-02 DIAGNOSIS — I50.20 HFREF (HEART FAILURE WITH REDUCED EJECTION FRACTION) (HCC): ICD-10-CM

## 2023-11-02 DIAGNOSIS — I44.7 LBBB (LEFT BUNDLE BRANCH BLOCK): Chronic | ICD-10-CM

## 2023-11-02 DIAGNOSIS — I63.9 CEREBROVASCULAR ACCIDENT (CVA), UNSPECIFIED MECHANISM (HCC): ICD-10-CM

## 2023-11-02 DIAGNOSIS — Z13.6 SCREENING FOR AAA (ABDOMINAL AORTIC ANEURYSM): ICD-10-CM

## 2023-11-02 DIAGNOSIS — I10 ESSENTIAL HYPERTENSION: ICD-10-CM

## 2023-11-02 DIAGNOSIS — D68.69 HYPERCOAGULABLE STATE DUE TO PAROXYSMAL ATRIAL FIBRILLATION (HCC): ICD-10-CM

## 2023-11-02 DIAGNOSIS — I48.0 PAROXYSMAL ATRIAL FIBRILLATION (HCC): Chronic | ICD-10-CM

## 2023-11-02 DIAGNOSIS — E78.5 HYPERLIPIDEMIA LDL GOAL <70: ICD-10-CM

## 2023-11-02 PROCEDURE — 99214 OFFICE O/P EST MOD 30 MIN: CPT | Performed by: INTERNAL MEDICINE

## 2023-11-02 PROCEDURE — 3074F SYST BP LT 130 MM HG: CPT | Performed by: INTERNAL MEDICINE

## 2023-11-02 PROCEDURE — 3078F DIAST BP <80 MM HG: CPT | Performed by: INTERNAL MEDICINE

## 2023-11-02 RX ORDER — FUROSEMIDE 20 MG/1
20 TABLET ORAL
Qty: 90 TABLET | Refills: 3 | Status: SHIPPED | OUTPATIENT
Start: 2023-11-02

## 2023-11-02 RX ORDER — EMPAGLIFLOZIN 10 MG/1
10 TABLET, FILM COATED ORAL DAILY
Qty: 90 TABLET | Refills: 3 | Status: SHIPPED | OUTPATIENT
Start: 2023-11-02

## 2023-11-02 RX ORDER — CARVEDILOL 3.12 MG/1
3.12 TABLET ORAL 2 TIMES DAILY WITH MEALS
Qty: 180 TABLET | Refills: 3 | Status: SHIPPED | OUTPATIENT
Start: 2023-11-02

## 2023-11-02 RX ORDER — ATORVASTATIN CALCIUM 80 MG/1
40 TABLET, FILM COATED ORAL NIGHTLY
Qty: 90 TABLET | Refills: 3 | Status: SHIPPED | OUTPATIENT
Start: 2023-11-02

## 2023-11-02 RX ORDER — SACUBITRIL AND VALSARTAN 24; 26 MG/1; MG/1
1 TABLET, FILM COATED ORAL 2 TIMES DAILY
Qty: 180 TABLET | Refills: 3 | Status: SHIPPED | OUTPATIENT
Start: 2023-11-02

## 2023-11-02 RX ORDER — SPIRONOLACTONE 25 MG/1
25 TABLET ORAL DAILY
Qty: 90 TABLET | Refills: 3 | Status: SHIPPED | OUTPATIENT
Start: 2023-11-02

## 2023-11-02 NOTE — PATIENT INSTRUCTIONS
A - Antiplatelet - Clopidogrel (PLAVIX) reduces your risk of cardiac event by 27% compared to Aspirin 81 mg daily, prasrugrel (EFFIENT), ticagrelor (BRILINTA)) may be used for the first year.  Aspirin 81 mg daily is assocaited with a 20% less use of heart event and is used the first year after a cardiac event, stent or CABG  B - Blood Pressure Control - reduces your risk or heart attack and stroke.  C - Cholesterol Management - statins reduce your risk; for those that are intolerant to statins, there are alternatives.  D - Diet - Cardiac rehab diets, Cardiosmart.org.  E - Exercise - at least 5 hours of moderate exercise weekly  (typical brisk walking or similar activity).  F - Fats - VASCEPA,  or Fish oil (if Vascepa too expensive) for elevated triglycerides (REDUCE IT trial showed reduction from 22% 5 year MACE to 17%).  G - Good Vibes - meditation, exercise, yoga, Pilates, mindfulness, Buzz-Chi, stress reduction.  H - Heart Failure - betablockers, sucubatril (ENTRESTO) 16% less risk of dying over 3 years, spironolactone, dapagliflozin (FARXIGA) 17% less risk of dying over 2 years, CRT +/- ICD.  I - Inflammation - Colchicine in the LoDoCo2 study in 2020 reduced the risk of heart attack by 30% in 2.5 year follow up.  R - Rehab - Cardiac rehab reduces risk of dying by 13-24% and need to go to the hospital by 30% within the first year.  S - Smoking - never smoke, if you do smoke ask for help to quit for good. Patients who quit smoking after heart attack have 36% less likely risk of dying.  Resources are 1-800-QUIT-NOW [facility] in addition to Chantix, Zyban or nicotine replacement  T - Type II Diabetes - pills empagliflozin (JARDIANCE) 38% less risk of dying over 4 years, and/or weekly injections liraglutide (Victoza), semaglutide (Ozempic), dulaglutide (Trulicity) ~26% less risk of MACE in 2 years.  V - Vaccines - Annual flu shot and COVID vaccine reduces the risk of serious cardiovascular  complications from these deadly infections.  W - Weight - maintain a healthy weight.      Work on at least 1.5 - 5 hours a week of moderate exercise    Please look into the following diets and incorporate them into your diet  LOW SALT DIET   KEEP YOUR SODIUM EQUAL TO CALORIES AND NO MORE THAN DOUBLE THE CALORIES FOR A LOW SALT DIET    Cardiosmart.org - great resource for American College of Cardiology on heart disease prevention and treatment    FOR TREATMENT OR PREVENTION OF CORONARY ARTERY DISEASE  These three programs are approved by Medicare/Insurers for those with heart disease  [place]  Dr. Pierce's Program for Reversing Heart Disease - Wilian Reynosos Cardiologist vegetarian-based  [facility] - Leslie-based mind-body Program    Mediterranean Diet has been shown to be a hearty healthy diet.    This is a commonly referenced Program  Dr Barnes - [place] over [place] (book and documentary) - vegetarian-based    FOR TREATMENT OF BLOOD PRESSURE  DASH DIET - American Heart Association for treatment of HYPERTENSION    FOR TREATMENT OF BAD CHOLESTEROL/FATS  REDUCE PROCESSED SUGAR AS MUCH AS POSSIBLE  INCREASE WHOLE GRAINS/VEGETABLES  INCREASE FIBER    Lowering total cholesterol and LDL (bad) cholesterol:  - Eat leaner cuts of meat, or eliminate altogether if possible red meat, and frequently substitute fish or chicken.  - Limit saturated fat to no more than 7-10% of total calories no more than 10 g per day is recommended. Some sources of saturated fat include butter, animal fats, hydrogenated vegetable fats and oils, many desserts, whole milk dairy products.  - Replaced saturated fats with polyunsaturated fats and monounsaturated fats. Foods high in monounsaturated fat include nuts, canola oil, avocados, and olives.  - Limit trans fat (processed foods) and replaced with fresh fruits and vegetables  - Recommend nonfat dairy products  - Increase  substantially the amount of soluble fiber intake (legumes such as beans, fruit, whole grains).  - Consider nutritional supplements: plant sterile spreads such as Benecol, fish oil,  flaxseed oil, omega-3 acids capsules 1000 mg twice a day, or viscous fiber such as Metamucil  - Attain ideal weight and regular exercise (at least 30 minutes per day of moderate exercise)  ASK ABOUT STATIN OR NON STATIN MEDICATION TO REDUCE YOUR LDL AND HEART RISK    Lowering triglycerides:  - Reduce intake of simple sugar: Desserts, candy, pastries, honey, sodas, sugared cereals, yogurt, Gatorade, sports bars, canned fruit, smoothies, fruit juice, coffee drinks  - Reduced intake of refined starches: Refined Pasta, most bread  - Reduce or abstain from alcohol  - Increase omega-3 fatty acids: Pyrites, Trout, Mackerel, Herring, Albacore tuna and supplements  - Attain ideal weight and regular exercise (at least 30 minutes per day of moderate exercise)  ASK ABOUT PURIFIED OMEGA 3 EPA or FISH OIL TO REDUCE YOUR TG AND HEART RISK    Elevating HDL (good) cholesterol:  - Increase physical activity  - Increase omega-3 fatty acids and supplements as listed above  - Incorporating appropriate amounts of monounsaturated fats such as nuts, olive oil, canola oil, avocados, olives  - Stop smoking  - Attain ideal weight and regular exercise (at least 30 minutes per day of moderate exercise)

## 2023-11-02 NOTE — PROGRESS NOTES
"Chief Complaint   Patient presents with    Congestive Heart Failure     FV Dx: HFrEF       Subjective     Ramon Avery is a 73 y.o. male who presents today for follow-up of his history of CABG paroxysmal atrial fibrillation     Doing well    Still does some heavy equipment opearatin        Past Medical History:   Diagnosis Date    Anesthesia     \"difficult to put to sleep\"    Anginal syndrome (HCC)     CAD    Arthritis     upper back    Dependence on supplemental oxygen     Heart burn     High cholesterol     Hypertension     Obesity     Paroxysmal atrial fibrillation (HCC)     Paroxysmal atrial fibrillation - postoperative from CABG     S/P CABG x 3 5/2016 Dr Ryder for 3 vessel disease including left main aneurysm      Past Surgical History:   Procedure Laterality Date    OTHER CARDIAC SURGERY  5/12/16    CABG    MULTIPLE CORONARY ARTERY BYPASS ENDO VEIN HARVEST  5/12/2016    Procedure: MULTIPLE CORONARY ARTERY BYPASS X3,  ENDO VEIN HARVEST , SYLVIA AND PREVENA DRESSING ;  Surgeon: Bob Ryder M.D.;  Location: SURGERY [facility];  Service:     RECOVERY  5/9/2016    Procedure: CATH LAB [facility] [place] WITH POSSIBLE DR. HE;  Surgeon: [facility];  Location: SURGERY PRE-POST PROC UNIT [facility];  Service:     OTHER  1990    Circumcision     Family History   Problem Relation Age of Onset    Heart Disease Father      Social History     Socioeconomic History    Marital status:      Spouse name: Not on file    Number of children: Not on file    Years of education: Not on file    Highest education level: Not on file   Occupational History    Not on file   Tobacco Use    Smoking status: Never    Smokeless tobacco: Never   Substance and Sexual Activity    Alcohol use: No    Drug use: No    Sexual activity: Not on file   Other Topics Concern    Not on file   Social History Narrative    Not on file     Social Determinants of Health     Financial Resource Strain: Not on file   Food Insecurity: Not on file " "  Transportation Needs: Not on file   Physical Activity: Not on file   Stress: Not on file   Social Connections: Not on file   Intimate Partner Violence: Not on file   Housing Stability: Not on file     Allergies   Allergen Reactions    [place] Swelling     Lip swelling. Not anaphylactic.     Outpatient Encounter Medications as of 11/2/2023   Medication Sig Dispense Refill    atorvastatin (LIPITOR) 80 MG tablet Take 0.5 Tablets by mouth every evening. 90 Tablet 3    spironolactone (ALDACTONE) 25 MG Tab Take 1 Tablet by mouth every day. 90 Tablet 3    Empagliflozin (JARDIANCE) 10 MG Tab tablet Take 1 Tablet by mouth every day. 90 Tablet 3    sacubitril-valsartan (ENTRESTO) 24-26 MG Tab Take 1 Tablet by mouth 2 times a day. Must complete labs and keep visit scheduled 11/2/2023 180 Tablet 3    carvedilol (COREG) 3.125 MG Tab Take 1 Tablet by mouth 2 times a day with meals. 180 Tablet 3    apixaban (ELIQUIS) 5mg Tab Take 1 Tablet by mouth 2 times a day. 180 Tablet 3    NON SPECIFIED Indications: Herbal supplement for weight management      [DISCONTINUED] sacubitril-valsartan (ENTRESTO) 24-26 MG Tab Take 1 Tablet by mouth 2 times a day. Must complete labs and keep visit scheduled 11/2/2023 60 Tablet 0    [DISCONTINUED] carvedilol (COREG) 3.125 MG Tab TAKE 1 TABLET BY MOUTH TWICE DAILY WITH MEALS 180 Tablet 0    [DISCONTINUED] JARDIANCE 10 MG Tab tablet TAKE 1 TABLET BY MOUTH EVERY DAY 90 Tablet 0    [DISCONTINUED] atorvastatin (LIPITOR) 40 MG Tab TAKE 1 TABLET BY MOUTH EVERY EVENING 90 Tablet 1    [DISCONTINUED] ELIQUIS 5 MG Tab TAKE 1 TABLET BY MOUTH TWICE DAILY 180 Tablet 3    [DISCONTINUED] spironolactone (ALDACTONE) 25 MG Tab Take 1 Tablet by mouth every day. 90 Tablet 3     No facility-administered encounter medications on file as of 11/2/2023.     ROS           Objective     /52 (BP Location: Left arm, Patient Position: Sitting, BP Cuff Size: Adult)   Pulse 60   Resp 15   Ht 1.803 m (5' 11\")   Wt 124 kg " (274 lb)   SpO2 93%   BMI 38.22 kg/m²     Physical Exam  Constitutional:       General: He is not in acute distress.     Appearance: He is not diaphoretic.   Eyes:      General: No scleral icterus.  Neck:      Vascular: No JVD.   Cardiovascular:      Rate and Rhythm: Normal rate.      Heart sounds: Normal heart sounds. No murmur heard.     No friction rub. No gallop.   Pulmonary:      Effort: No respiratory distress.      Breath sounds: No wheezing or rales.   Abdominal:      General: Bowel sounds are normal.      Palpations: Abdomen is soft.   Musculoskeletal:      Right lower leg: No edema.      Left lower leg: No edema.   Skin:     Findings: No rash.   Neurological:      Mental Status: He is alert. Mental status is at baseline.   Psychiatric:         Mood and Affect: Mood normal.            We reviewed in person the most recent labs  Recent Results (from the past 5040 hour(s))   [employer] COLON CANCER SCREENING    Collection Time: 05/05/23 12:30 PM   Result Value Ref Range    Noninvasive colorectal cancer DNA and occult blood screening in Stool Positive (A) Negative         Assessment & Plan     1. S/P CABG x 3 5/2016 Dr Ryder for 3 vessel disease including left main aneurysm  atorvastatin (LIPITOR) 80 MG tablet      2. Paroxysmal atrial fibrillation - postoperative from CABG and then on event monitor 3/2019  apixaban (ELIQUIS) 5mg Tab    Referral to Pharmacotherapy Service      3. Hyperlipidemia LDL goal <70  Lipid Profile    atorvastatin (LIPITOR) 80 MG tablet    Empagliflozin (JARDIANCE) 10 MG Tab tablet      4. Ischemic cardiomyopathy  Comp Metabolic Panel    CBC WITHOUT DIFFERENTIAL    atorvastatin (LIPITOR) 80 MG tablet    spironolactone (ALDACTONE) 25 MG Tab    carvedilol (COREG) 3.125 MG Tab      5. LBBB (left bundle branch block)        6. HFrEF (heart failure with reduced ejection fraction) (HCC)  Empagliflozin (JARDIANCE) 10 MG Tab tablet      7. Coronary artery disease due to calcified coronary  lesion  sacubitril-valsartan (ENTRESTO) 24-26 MG Tab      8. Dyslipidemia        9. Essential hypertension        10. First degree atrioventricular block        11. Cerebrovascular accident (CVA), unspecified mechanism (HCC)  apixaban (ELIQUIS) 5mg Tab      12. Hypercoagulable state due to paroxysmal atrial fibrillation (HCC)  Referral to Pharmacotherapy Service      13. Screening for AAA (abdominal aortic aneurysm)  US-AORTA/ILIACS DUPLEX LIMITED          Medical Decision Making: Today's Assessment/Status/Plan:          It was my pleasure to meet with Mr. Avery.    We addressed the management of hypertension at today's visit. Blood pressure is well controlled.  We specifically assessed the labs on hypertension treatment    We addressed the management of dyslipidemia and atherosclerosis at today's visit. He is on appropriate statin.    INCREASED ATRORVASTATIN to 80 MG LDL GOAL     We addressed the management of congestive heart failure with reduced ejection fraction .  He is on proper mineralacorticoid, angiotensin/ace inhibition with neprilysin inhibition, SGLTs inhibitor and beta-blockers as appropriate.  We addressed the potential side effects and regular laboratory follow-up for these medications. NOT ABLE TO TAKE MORE CARVEDILOL DUE TO BRADYCARDIA OR MORE ENTRESTO DUE TO ORTHOSTASIS     STRONGLY CONSIDER BIV ICD he is a        We addressed the management of atrial fibrillation.  He is on proper anticoagulation cholesterol management and rate or rhythm control as appropriate.  We addressed the potential side effects and laboratory follow-up for these medications.    We addressed the management of chronic anticoagulation at today's visit. He understands the risks and benefits of chronic anticoagulation.  We reviewed and verified the results of annual testing for anemia and kidney function.  LIKELY TO SWITCH TO PRADAXA IF AVAILABLE AT LOWER COST    I will see Mr. Avery back in 1 year time  and encouraged him to follow up with us over the phone or electronically using my MyChart as issues arise.    It is my pleasure to participate in the care of Mr. Avery.  Please do not hesitate to contact me with questions or concerns.    Deon Gibson MD PhD [facility]  Cardiologist [facility]    Please note that this dictation was created using voice recognition software. There may be errors I did not discover before finalizing the note.

## 2023-11-03 ENCOUNTER — TELEPHONE (OUTPATIENT)
Dept: VASCULAR LAB | Facility: MEDICAL CENTER | Age: 73
End: 2023-11-03
Payer: MEDICARE

## 2023-11-03 NOTE — TELEPHONE ENCOUNTER
[facility] and Pharmacotherapy [facility]     Received anticoagulation referral from Dr. Gibson on 11/2/23.    Per referral - pt interested in dabigatran via 340b.     Called patient to schedule an appt to establish care. No answer. LVM.    1st call     Insurance: [employer] Medicare  PCP: External  Locations to be seen: [facility]     If no response by 12/2/23 (1 month from referral date) OR 2 no shows/cancellations, will remove from referral list and send FYI to referring provider    [facility]armacotherapy [facility] at [phone], fax [phone]    Dandre Pierre, PhoebeD, BCACP

## 2023-11-06 NOTE — TELEPHONE ENCOUNTER
Received VM from pt returning our call.  Attempted to call pt back - no answer. LM to call us back.  [place], PhoebeD

## 2023-11-10 ENCOUNTER — TELEPHONE (OUTPATIENT)
Dept: VASCULAR LAB | Facility: MEDICAL CENTER | Age: 73
End: 2023-11-10
Payer: MEDICARE

## 2023-11-10 NOTE — TELEPHONE ENCOUNTER
[facility] and Pharmacotherapy Programs     Received anticoagulation referral from Dr. Gibson on 11/2/23.    Per referral - pt interested in dabigatran via 340b.     Called patient to schedule an appt to establish care. Unable to leave VM.  2nd call    Gauzy message sent     Insurance: [employer]na Medicare  PCP: External  Locations to be seen: [facility]     If no response by 12/2/23 (1 month from referral date) OR 2 no shows/cancellations, will remove from referral list and send FYI to referring provider    [facility] Anticoagulation/Pharmacotherapy Clinic at [phone], fax [phone]    Josselin Mata, PharmD, BCACP

## 2023-11-17 ENCOUNTER — TELEPHONE (OUTPATIENT)
Dept: VASCULAR LAB | Facility: MEDICAL CENTER | Age: 73
End: 2023-11-17
Payer: MEDICARE

## 2023-11-17 NOTE — TELEPHONE ENCOUNTER
[facility] Pharmac[facility]     Received anticoagulation referral from Dr. Gibson on 11/2/23.     Per referral - pt interested in dabigatran via 340b.     Called patient to schedule an appt to establish care. Unable to leave VM.  3rd call     Wikets message sent    Sent letter.     Insurance: [employer] Medicare  PCP: External  Locations to be seen: [place]      If no response by 12/2/23 (1 month from referral date) OR 2 no shows/cancellations, will remove from referral list and send FYI to referring provider     [facility] Anticoagulation/Pharmacotherapy [facility] at [phone], fax [phone]    Dandre Pierre, PharmD, BCACP

## 2023-11-17 NOTE — LETTER
[address] [facility] [address]        Dear Ramon Avery ,    We have been unsuccessful in our attempts to contact you regarding your Clinical Pharmacist referral.  Please contact us if you would like to schedule an appointment for help managing your anticoagulation medication.    Please contact our clinic so we may assist you.  We are open Monday-Friday 8 am until 5 pm.  You may reach our Service at [phone].        Sincerely,    Deangelo Villa PharmD, [facility]  Clinic Supervisor  [facility]  Outpatient Anticoagulation Service

## 2023-11-24 DIAGNOSIS — Z95.1 S/P CABG X 3: Chronic | ICD-10-CM

## 2023-11-24 DIAGNOSIS — E78.5 HYPERLIPIDEMIA LDL GOAL <70: ICD-10-CM

## 2023-11-24 DIAGNOSIS — I25.5 ISCHEMIC CARDIOMYOPATHY: ICD-10-CM

## 2023-11-27 RX ORDER — ATORVASTATIN CALCIUM 40 MG/1
40 TABLET, FILM COATED ORAL NIGHTLY
Qty: 90 TABLET | Refills: 1 | OUTPATIENT
Start: 2023-11-27

## 2023-12-13 ENCOUNTER — PATIENT MESSAGE (OUTPATIENT)
Dept: CARDIOLOGY | Facility: PHYSICIAN GROUP | Age: 73
End: 2023-12-13
Payer: MEDICARE

## 2023-12-14 ENCOUNTER — PATIENT MESSAGE (OUTPATIENT)
Dept: CARDIOLOGY | Facility: PHYSICIAN GROUP | Age: 73
End: 2023-12-14
Payer: MEDICARE

## 2023-12-19 ENCOUNTER — PATIENT MESSAGE (OUTPATIENT)
Dept: CARDIOLOGY | Facility: PHYSICIAN GROUP | Age: 73
End: 2023-12-19
Payer: MEDICARE

## 2024-01-09 DIAGNOSIS — Z13.6 SCREENING FOR AAA (ABDOMINAL AORTIC ANEURYSM): ICD-10-CM

## 2024-03-12 ENCOUNTER — PATIENT MESSAGE (OUTPATIENT)
Dept: CARDIOLOGY | Facility: MEDICAL CENTER | Age: 74
End: 2024-03-12
Payer: MEDICARE

## 2024-03-12 DIAGNOSIS — I25.10 CORONARY ARTERY DISEASE DUE TO CALCIFIED CORONARY LESION: ICD-10-CM

## 2024-03-12 DIAGNOSIS — I25.84 CORONARY ARTERY DISEASE DUE TO CALCIFIED CORONARY LESION: ICD-10-CM

## 2024-05-22 DIAGNOSIS — Z95.1 S/P CABG X 3: Chronic | ICD-10-CM

## 2024-05-22 DIAGNOSIS — E78.5 HYPERLIPIDEMIA LDL GOAL <70: ICD-10-CM

## 2024-05-22 DIAGNOSIS — I25.5 ISCHEMIC CARDIOMYOPATHY: ICD-10-CM

## 2024-05-22 RX ORDER — ATORVASTATIN CALCIUM 40 MG/1
40 TABLET, FILM COATED ORAL NIGHTLY
Qty: 90 TABLET | Refills: 2 | Status: SHIPPED | OUTPATIENT
Start: 2024-05-22

## 2024-05-22 NOTE — TELEPHONE ENCOUNTER
Is the patient due for a refill? Yes    Was the patient seen the past year? Yes    Date of last office visit: 11/02/2023    Does the patient have an upcoming appointment?  No      Provider to refill:CW    Does the patients insurance require a 100 day supply?  No

## 2024-12-01 ENCOUNTER — HOSPITAL ENCOUNTER (INPATIENT)
Facility: MEDICAL CENTER | Age: 74
LOS: 3 days | DRG: 322 | End: 2024-12-05
Attending: EMERGENCY MEDICINE | Admitting: INTERNAL MEDICINE
Payer: MEDICARE

## 2024-12-01 ENCOUNTER — APPOINTMENT (OUTPATIENT)
Dept: RADIOLOGY | Facility: MEDICAL CENTER | Age: 74
DRG: 322 | End: 2024-12-01
Attending: EMERGENCY MEDICINE
Payer: MEDICARE

## 2024-12-01 DIAGNOSIS — I25.5 ISCHEMIC CARDIOMYOPATHY: ICD-10-CM

## 2024-12-01 DIAGNOSIS — R07.9 CHEST PAIN, UNSPECIFIED TYPE: ICD-10-CM

## 2024-12-01 DIAGNOSIS — I44.0 FIRST DEGREE ATRIOVENTRICULAR BLOCK: ICD-10-CM

## 2024-12-01 DIAGNOSIS — I20.0 UNSTABLE ANGINA (HCC): ICD-10-CM

## 2024-12-01 DIAGNOSIS — I48.0 PAROXYSMAL ATRIAL FIBRILLATION (HCC): Chronic | ICD-10-CM

## 2024-12-01 DIAGNOSIS — Z86.79 HISTORY OF CAD (CORONARY ARTERY DISEASE): ICD-10-CM

## 2024-12-01 PROBLEM — E11.9 TYPE 2 DIABETES MELLITUS WITHOUT COMPLICATION, WITHOUT LONG-TERM CURRENT USE OF INSULIN (HCC): Status: RESOLVED | Noted: 2017-12-07 | Resolved: 2024-12-01

## 2024-12-01 LAB
ALBUMIN SERPL BCP-MCNC: 4.4 G/DL (ref 3.2–4.9)
ALBUMIN/GLOB SERPL: 1.8 G/DL
ALP SERPL-CCNC: 62 U/L (ref 30–99)
ALT SERPL-CCNC: 15 U/L (ref 2–50)
ANION GAP SERPL CALC-SCNC: 12 MMOL/L (ref 7–16)
AST SERPL-CCNC: 12 U/L (ref 12–45)
BASOPHILS # BLD AUTO: 0.3 % (ref 0–1.8)
BASOPHILS # BLD: 0.04 K/UL (ref 0–0.12)
BILIRUB SERPL-MCNC: 0.9 MG/DL (ref 0.1–1.5)
BUN SERPL-MCNC: 17 MG/DL (ref 8–22)
CALCIUM ALBUM COR SERPL-MCNC: 8.9 MG/DL (ref 8.5–10.5)
CALCIUM SERPL-MCNC: 9.2 MG/DL (ref 8.5–10.5)
CHLORIDE SERPL-SCNC: 102 MMOL/L (ref 96–112)
CO2 SERPL-SCNC: 22 MMOL/L (ref 20–33)
CREAT SERPL-MCNC: 0.85 MG/DL (ref 0.5–1.4)
EKG IMPRESSION: NORMAL
EKG IMPRESSION: NORMAL
EOSINOPHIL # BLD AUTO: 0.05 K/UL (ref 0–0.51)
EOSINOPHIL NFR BLD: 0.4 % (ref 0–6.9)
ERYTHROCYTE [DISTWIDTH] IN BLOOD BY AUTOMATED COUNT: 49 FL (ref 35.9–50)
GFR SERPLBLD CREATININE-BSD FMLA CKD-EPI: 92 ML/MIN/1.73 M 2
GLOBULIN SER CALC-MCNC: 2.5 G/DL (ref 1.9–3.5)
GLUCOSE SERPL-MCNC: 107 MG/DL (ref 65–99)
HCT VFR BLD AUTO: 43.8 % (ref 42–52)
HGB BLD-MCNC: 14.6 G/DL (ref 14–18)
IMM GRANULOCYTES # BLD AUTO: 0.03 K/UL (ref 0–0.11)
IMM GRANULOCYTES NFR BLD AUTO: 0.3 % (ref 0–0.9)
LYMPHOCYTES # BLD AUTO: 1.39 K/UL (ref 1–4.8)
LYMPHOCYTES NFR BLD: 11.8 % (ref 22–41)
MCH RBC QN AUTO: 32.9 PG (ref 27–33)
MCHC RBC AUTO-ENTMCNC: 33.3 G/DL (ref 32.3–36.5)
MCV RBC AUTO: 98.6 FL (ref 81.4–97.8)
MONOCYTES # BLD AUTO: 1.69 K/UL (ref 0–0.85)
MONOCYTES NFR BLD AUTO: 14.3 % (ref 0–13.4)
NEUTROPHILS # BLD AUTO: 8.61 K/UL (ref 1.82–7.42)
NEUTROPHILS NFR BLD: 72.9 % (ref 44–72)
NRBC # BLD AUTO: 0 K/UL
NRBC BLD-RTO: 0 /100 WBC (ref 0–0.2)
NT-PROBNP SERPL IA-MCNC: 787 PG/ML (ref 0–125)
PLATELET # BLD AUTO: 235 K/UL (ref 164–446)
PMV BLD AUTO: 9.7 FL (ref 9–12.9)
POTASSIUM SERPL-SCNC: 4 MMOL/L (ref 3.6–5.5)
PROCALCITONIN SERPL-MCNC: 0.06 NG/ML
PROT SERPL-MCNC: 6.9 G/DL (ref 6–8.2)
RBC # BLD AUTO: 4.44 M/UL (ref 4.7–6.1)
SODIUM SERPL-SCNC: 136 MMOL/L (ref 135–145)
TROPONIN T SERPL-MCNC: 18 NG/L (ref 6–19)
TROPONIN T SERPL-MCNC: 19 NG/L (ref 6–19)
TROPONIN T SERPL-MCNC: 20 NG/L (ref 6–19)
WBC # BLD AUTO: 11.8 K/UL (ref 4.8–10.8)

## 2024-12-01 PROCEDURE — 700102 HCHG RX REV CODE 250 W/ 637 OVERRIDE(OP)

## 2024-12-01 PROCEDURE — G0378 HOSPITAL OBSERVATION PER HR: HCPCS

## 2024-12-01 PROCEDURE — 71045 X-RAY EXAM CHEST 1 VIEW: CPT

## 2024-12-01 PROCEDURE — 36415 COLL VENOUS BLD VENIPUNCTURE: CPT

## 2024-12-01 PROCEDURE — 700102 HCHG RX REV CODE 250 W/ 637 OVERRIDE(OP): Performed by: EMERGENCY MEDICINE

## 2024-12-01 PROCEDURE — A9270 NON-COVERED ITEM OR SERVICE: HCPCS

## 2024-12-01 PROCEDURE — A9270 NON-COVERED ITEM OR SERVICE: HCPCS | Performed by: EMERGENCY MEDICINE

## 2024-12-01 PROCEDURE — 93005 ELECTROCARDIOGRAM TRACING: CPT

## 2024-12-01 PROCEDURE — 93010 ELECTROCARDIOGRAM REPORT: CPT | Performed by: INTERNAL MEDICINE

## 2024-12-01 PROCEDURE — 84145 PROCALCITONIN (PCT): CPT

## 2024-12-01 PROCEDURE — 85025 COMPLETE CBC W/AUTO DIFF WBC: CPT

## 2024-12-01 PROCEDURE — 99285 EMERGENCY DEPT VISIT HI MDM: CPT

## 2024-12-01 PROCEDURE — 80053 COMPREHEN METABOLIC PANEL: CPT

## 2024-12-01 PROCEDURE — 83880 ASSAY OF NATRIURETIC PEPTIDE: CPT

## 2024-12-01 PROCEDURE — 93005 ELECTROCARDIOGRAM TRACING: CPT | Performed by: EMERGENCY MEDICINE

## 2024-12-01 PROCEDURE — 99222 1ST HOSP IP/OBS MODERATE 55: CPT

## 2024-12-01 PROCEDURE — 84484 ASSAY OF TROPONIN QUANT: CPT | Mod: 91

## 2024-12-01 RX ORDER — ASPIRIN 325 MG
325 TABLET ORAL ONCE
Status: DISCONTINUED | OUTPATIENT
Start: 2024-12-01 | End: 2024-12-01

## 2024-12-01 RX ORDER — ONDANSETRON 4 MG/1
4 TABLET, ORALLY DISINTEGRATING ORAL EVERY 4 HOURS PRN
Status: DISCONTINUED | OUTPATIENT
Start: 2024-12-01 | End: 2024-12-05 | Stop reason: HOSPADM

## 2024-12-01 RX ORDER — AMINOPHYLLINE 25 MG/ML
100 INJECTION, SOLUTION INTRAVENOUS
Status: DISCONTINUED | OUTPATIENT
Start: 2024-12-01 | End: 2024-12-05 | Stop reason: HOSPADM

## 2024-12-01 RX ORDER — ASPIRIN 81 MG/1
324 TABLET, CHEWABLE ORAL ONCE
Status: DISCONTINUED | OUTPATIENT
Start: 2024-12-01 | End: 2024-12-01

## 2024-12-01 RX ORDER — ACETAMINOPHEN 325 MG/1
650 TABLET ORAL EVERY 6 HOURS PRN
Status: DISCONTINUED | OUTPATIENT
Start: 2024-12-01 | End: 2024-12-05 | Stop reason: HOSPADM

## 2024-12-01 RX ORDER — ASPIRIN 81 MG/1
81 TABLET ORAL DAILY
Status: DISCONTINUED | OUTPATIENT
Start: 2024-12-02 | End: 2024-12-05 | Stop reason: HOSPADM

## 2024-12-01 RX ORDER — REGADENOSON 0.08 MG/ML
0.4 INJECTION, SOLUTION INTRAVENOUS
Status: DISCONTINUED | OUTPATIENT
Start: 2024-12-01 | End: 2024-12-05 | Stop reason: HOSPADM

## 2024-12-01 RX ORDER — ATORVASTATIN CALCIUM 40 MG/1
40 TABLET, FILM COATED ORAL NIGHTLY
Status: DISCONTINUED | OUTPATIENT
Start: 2024-12-01 | End: 2024-12-02

## 2024-12-01 RX ORDER — ONDANSETRON 2 MG/ML
4 INJECTION INTRAMUSCULAR; INTRAVENOUS EVERY 4 HOURS PRN
Status: DISCONTINUED | OUTPATIENT
Start: 2024-12-01 | End: 2024-12-05 | Stop reason: HOSPADM

## 2024-12-01 RX ORDER — ASPIRIN 300 MG/1
300 SUPPOSITORY RECTAL ONCE
Status: DISCONTINUED | OUTPATIENT
Start: 2024-12-01 | End: 2024-12-01

## 2024-12-01 RX ORDER — LABETALOL HYDROCHLORIDE 5 MG/ML
10 INJECTION, SOLUTION INTRAVENOUS EVERY 4 HOURS PRN
Status: DISCONTINUED | OUTPATIENT
Start: 2024-12-01 | End: 2024-12-05 | Stop reason: HOSPADM

## 2024-12-01 RX ORDER — ASPIRIN 81 MG/1
324 TABLET, CHEWABLE ORAL ONCE
Status: COMPLETED | OUTPATIENT
Start: 2024-12-01 | End: 2024-12-01

## 2024-12-01 RX ORDER — CYCLOBENZAPRINE HCL 10 MG
10 TABLET ORAL 3 TIMES DAILY PRN
Status: DISCONTINUED | OUTPATIENT
Start: 2024-12-01 | End: 2024-12-05 | Stop reason: HOSPADM

## 2024-12-01 RX ORDER — ALUMINA, MAGNESIA, AND SIMETHICONE 2400; 2400; 240 MG/30ML; MG/30ML; MG/30ML
10 SUSPENSION ORAL 4 TIMES DAILY PRN
Status: DISCONTINUED | OUTPATIENT
Start: 2024-12-01 | End: 2024-12-05 | Stop reason: HOSPADM

## 2024-12-01 RX ORDER — CARVEDILOL 6.25 MG/1
3.12 TABLET ORAL 2 TIMES DAILY WITH MEALS
Status: DISCONTINUED | OUTPATIENT
Start: 2024-12-01 | End: 2024-12-03

## 2024-12-01 RX ADMIN — APIXABAN 5 MG: 5 TABLET, FILM COATED ORAL at 19:30

## 2024-12-01 RX ADMIN — SACUBITRIL AND VALSARTAN 1 TABLET: 24; 26 TABLET, FILM COATED ORAL at 20:14

## 2024-12-01 RX ADMIN — ASPIRIN 324 MG: 81 TABLET, CHEWABLE ORAL at 17:43

## 2024-12-01 SDOH — ECONOMIC STABILITY: TRANSPORTATION INSECURITY
IN THE PAST 12 MONTHS, HAS THE LACK OF TRANSPORTATION KEPT YOU FROM MEDICAL APPOINTMENTS OR FROM GETTING MEDICATIONS?: NO

## 2024-12-01 SDOH — ECONOMIC STABILITY: TRANSPORTATION INSECURITY
IN THE PAST 12 MONTHS, HAS LACK OF RELIABLE TRANSPORTATION KEPT YOU FROM MEDICAL APPOINTMENTS, MEETINGS, WORK OR FROM GETTING THINGS NEEDED FOR DAILY LIVING?: NO

## 2024-12-01 ASSESSMENT — COGNITIVE AND FUNCTIONAL STATUS - GENERAL
SUGGESTED CMS G CODE MODIFIER MOBILITY: CH
MOBILITY SCORE: 24
SUGGESTED CMS G CODE MODIFIER DAILY ACTIVITY: CH
DAILY ACTIVITIY SCORE: 24

## 2024-12-01 ASSESSMENT — LIFESTYLE VARIABLES
TOTAL SCORE: 0
ALCOHOL_USE: NO
EVER FELT BAD OR GUILTY ABOUT YOUR DRINKING: NO
AVERAGE NUMBER OF DAYS PER WEEK YOU HAVE A DRINK CONTAINING ALCOHOL: 0
ON A TYPICAL DAY WHEN YOU DRINK ALCOHOL HOW MANY DRINKS DO YOU HAVE: 0
HOW MANY TIMES IN THE PAST YEAR HAVE YOU HAD 5 OR MORE DRINKS IN A DAY: 0
CONSUMPTION TOTAL: NEGATIVE
TOTAL SCORE: 0
HAVE PEOPLE ANNOYED YOU BY CRITICIZING YOUR DRINKING: NO
EVER HAD A DRINK FIRST THING IN THE MORNING TO STEADY YOUR NERVES TO GET RID OF A HANGOVER: NO
HAVE YOU EVER FELT YOU SHOULD CUT DOWN ON YOUR DRINKING: NO
TOTAL SCORE: 0

## 2024-12-01 ASSESSMENT — SOCIAL DETERMINANTS OF HEALTH (SDOH)
IN THE PAST 12 MONTHS, HAS THE ELECTRIC, GAS, OIL, OR WATER COMPANY THREATENED TO SHUT OFF SERVICE IN YOUR HOME?: NO
WITHIN THE LAST YEAR, HAVE YOU BEEN AFRAID OF YOUR PARTNER OR EX-PARTNER?: NO
WITHIN THE LAST YEAR, HAVE YOU BEEN HUMILIATED OR EMOTIONALLY ABUSED IN OTHER WAYS BY YOUR PARTNER OR EX-PARTNER?: NO
WITHIN THE PAST 12 MONTHS, YOU WORRIED THAT YOUR FOOD WOULD RUN OUT BEFORE YOU GOT THE MONEY TO BUY MORE: NEVER TRUE
WITHIN THE PAST 12 MONTHS, THE FOOD YOU BOUGHT JUST DIDN'T LAST AND YOU DIDN'T HAVE MONEY TO GET MORE: NEVER TRUE
WITHIN THE LAST YEAR, HAVE YOU BEEN KICKED, HIT, SLAPPED, OR OTHERWISE PHYSICALLY HURT BY YOUR PARTNER OR EX-PARTNER?: NO
WITHIN THE LAST YEAR, HAVE TO BEEN RAPED OR FORCED TO HAVE ANY KIND OF SEXUAL ACTIVITY BY YOUR PARTNER OR EX-PARTNER?: NO

## 2024-12-01 ASSESSMENT — ENCOUNTER SYMPTOMS
PALPITATIONS: 0
EYES NEGATIVE: 1
RESPIRATORY NEGATIVE: 1
GASTROINTESTINAL NEGATIVE: 1
PSYCHIATRIC NEGATIVE: 1
MUSCULOSKELETAL NEGATIVE: 1
CONSTITUTIONAL NEGATIVE: 1
NEUROLOGICAL NEGATIVE: 1

## 2024-12-01 ASSESSMENT — PATIENT HEALTH QUESTIONNAIRE - PHQ9
2. FEELING DOWN, DEPRESSED, IRRITABLE, OR HOPELESS: NOT AT ALL
SUM OF ALL RESPONSES TO PHQ9 QUESTIONS 1 AND 2: 0
1. LITTLE INTEREST OR PLEASURE IN DOING THINGS: NOT AT ALL

## 2024-12-01 ASSESSMENT — PAIN DESCRIPTION - PAIN TYPE: TYPE: ACUTE PAIN

## 2024-12-01 ASSESSMENT — FIBROSIS 4 INDEX: FIB4 SCORE: 0.95

## 2024-12-01 NOTE — ED TRIAGE NOTES
Chief Complaint   Patient presents with    Chest Pain     Pt c/o pain that started in his neck and moved down to his chest.  Pt states the pain was so severe that he was short of breath.  Pt was seen at [place] yesterday and they ruled out blood clots and heart attack.  Pt was diagnosed with pneumonia and prescribed antibiotics.  Pt states he is still having chest pain and shortness of breath so he came here.       Pt in WC from [facility].  Pt has not started his antibiotics yet. Chest pain protocol ordered. EKG completed in triage.     Pt is alert and oriented, speaking in full sentences, follows commands and responds appropriately to questions. Resp are even and unlabored.      Pt placed in [facility]. Pt educated on triage process. Pt encouraged to alert staff for any changes.     Patient and staff wearing appropriate PPE.    /56   Pulse 65   Temp 36.6 °C (97.9 °F) (Temporal)   Resp 16   Ht 1.829 m (6')   Wt 121 kg (266 lb 8.6 oz)   SpO2 94%

## 2024-12-02 ENCOUNTER — APPOINTMENT (OUTPATIENT)
Dept: RADIOLOGY | Facility: MEDICAL CENTER | Age: 74
DRG: 322 | End: 2024-12-02
Payer: MEDICARE

## 2024-12-02 ENCOUNTER — APPOINTMENT (OUTPATIENT)
Dept: CARDIOLOGY | Facility: MEDICAL CENTER | Age: 74
DRG: 322 | End: 2024-12-02
Payer: MEDICARE

## 2024-12-02 PROBLEM — I20.0 UNSTABLE ANGINA (HCC): Status: ACTIVE | Noted: 2024-12-02

## 2024-12-02 LAB
ANION GAP SERPL CALC-SCNC: 10 MMOL/L (ref 7–16)
BUN SERPL-MCNC: 22 MG/DL (ref 8–22)
CALCIUM SERPL-MCNC: 9 MG/DL (ref 8.5–10.5)
CHLORIDE SERPL-SCNC: 103 MMOL/L (ref 96–112)
CHOLEST SERPL-MCNC: 159 MG/DL (ref 100–199)
CO2 SERPL-SCNC: 26 MMOL/L (ref 20–33)
CREAT SERPL-MCNC: 0.91 MG/DL (ref 0.5–1.4)
EKG IMPRESSION: NORMAL
ERYTHROCYTE [DISTWIDTH] IN BLOOD BY AUTOMATED COUNT: 48.6 FL (ref 35.9–50)
EST. AVERAGE GLUCOSE BLD GHB EST-MCNC: 131 MG/DL
GFR SERPLBLD CREATININE-BSD FMLA CKD-EPI: 89 ML/MIN/1.73 M 2
GLUCOSE SERPL-MCNC: 102 MG/DL (ref 65–99)
HBA1C MFR BLD: 6.2 % (ref 4–5.6)
HCT VFR BLD AUTO: 41.1 % (ref 42–52)
HDLC SERPL-MCNC: 50 MG/DL
HGB BLD-MCNC: 13.4 G/DL (ref 14–18)
LDLC SERPL CALC-MCNC: 98 MG/DL
LV EJECT FRACT  99904: 39
LV EJECT FRACT MOD 2C 99903: 38.97
LV EJECT FRACT MOD 4C 99902: 40.93
LV EJECT FRACT MOD BP 99901: 42.15
MAGNESIUM SERPL-MCNC: 2.2 MG/DL (ref 1.5–2.5)
MCH RBC QN AUTO: 32.2 PG (ref 27–33)
MCHC RBC AUTO-ENTMCNC: 32.6 G/DL (ref 32.3–36.5)
MCV RBC AUTO: 98.8 FL (ref 81.4–97.8)
PLATELET # BLD AUTO: 221 K/UL (ref 164–446)
PMV BLD AUTO: 10.1 FL (ref 9–12.9)
POTASSIUM SERPL-SCNC: 4.2 MMOL/L (ref 3.6–5.5)
RBC # BLD AUTO: 4.16 M/UL (ref 4.7–6.1)
SODIUM SERPL-SCNC: 139 MMOL/L (ref 135–145)
TRIGL SERPL-MCNC: 56 MG/DL (ref 0–149)
TSH SERPL DL<=0.005 MIU/L-ACNC: 1.49 UIU/ML (ref 0.38–5.33)
WBC # BLD AUTO: 10 K/UL (ref 4.8–10.8)

## 2024-12-02 PROCEDURE — 93306 TTE W/DOPPLER COMPLETE: CPT | Mod: 26 | Performed by: INTERNAL MEDICINE

## 2024-12-02 PROCEDURE — 99223 1ST HOSP IP/OBS HIGH 75: CPT | Performed by: INTERNAL MEDICINE

## 2024-12-02 PROCEDURE — 99232 SBSQ HOSP IP/OBS MODERATE 35: CPT | Performed by: HOSPITALIST

## 2024-12-02 PROCEDURE — 96374 THER/PROPH/DIAG INJ IV PUSH: CPT

## 2024-12-02 PROCEDURE — 700102 HCHG RX REV CODE 250 W/ 637 OVERRIDE(OP): Performed by: HOSPITALIST

## 2024-12-02 PROCEDURE — 85027 COMPLETE CBC AUTOMATED: CPT

## 2024-12-02 PROCEDURE — 700102 HCHG RX REV CODE 250 W/ 637 OVERRIDE(OP)

## 2024-12-02 PROCEDURE — 700117 HCHG RX CONTRAST REV CODE 255

## 2024-12-02 PROCEDURE — 84443 ASSAY THYROID STIM HORMONE: CPT

## 2024-12-02 PROCEDURE — 700102 HCHG RX REV CODE 250 W/ 637 OVERRIDE(OP): Performed by: INTERNAL MEDICINE

## 2024-12-02 PROCEDURE — 770020 HCHG ROOM/CARE - TELE (206)

## 2024-12-02 PROCEDURE — 80048 BASIC METABOLIC PNL TOTAL CA: CPT

## 2024-12-02 PROCEDURE — 36415 COLL VENOUS BLD VENIPUNCTURE: CPT

## 2024-12-02 PROCEDURE — 83036 HEMOGLOBIN GLYCOSYLATED A1C: CPT

## 2024-12-02 PROCEDURE — 80061 LIPID PANEL: CPT

## 2024-12-02 PROCEDURE — A9270 NON-COVERED ITEM OR SERVICE: HCPCS | Performed by: INTERNAL MEDICINE

## 2024-12-02 PROCEDURE — A9270 NON-COVERED ITEM OR SERVICE: HCPCS

## 2024-12-02 PROCEDURE — 83735 ASSAY OF MAGNESIUM: CPT

## 2024-12-02 PROCEDURE — 93010 ELECTROCARDIOGRAM REPORT: CPT | Mod: 59 | Performed by: INTERNAL MEDICINE

## 2024-12-02 PROCEDURE — 93306 TTE W/DOPPLER COMPLETE: CPT

## 2024-12-02 PROCEDURE — A9270 NON-COVERED ITEM OR SERVICE: HCPCS | Performed by: HOSPITALIST

## 2024-12-02 PROCEDURE — 93005 ELECTROCARDIOGRAM TRACING: CPT

## 2024-12-02 RX ORDER — SPIRONOLACTONE 25 MG/1
25 TABLET ORAL
Status: DISCONTINUED | OUTPATIENT
Start: 2024-12-02 | End: 2024-12-05 | Stop reason: HOSPADM

## 2024-12-02 RX ORDER — NITROGLYCERIN 0.4 MG/1
0.4 TABLET SUBLINGUAL
Status: DISCONTINUED | OUTPATIENT
Start: 2024-12-02 | End: 2024-12-05 | Stop reason: HOSPADM

## 2024-12-02 RX ORDER — DAPAGLIFLOZIN 10 MG/1
10 TABLET, FILM COATED ORAL DAILY
Status: DISCONTINUED | OUTPATIENT
Start: 2024-12-02 | End: 2024-12-05

## 2024-12-02 RX ORDER — ATORVASTATIN CALCIUM 80 MG/1
80 TABLET, FILM COATED ORAL NIGHTLY
Status: DISCONTINUED | OUTPATIENT
Start: 2024-12-02 | End: 2024-12-05 | Stop reason: HOSPADM

## 2024-12-02 RX ADMIN — CARVEDILOL 3.12 MG: 6.25 TABLET, FILM COATED ORAL at 17:13

## 2024-12-02 RX ADMIN — DAPAGLIFLOZIN 10 MG: 10 TABLET, FILM COATED ORAL at 11:15

## 2024-12-02 RX ADMIN — NITROGLYCERIN 0.4 MG: 0.4 TABLET, ORALLY DISINTEGRATING SUBLINGUAL at 08:11

## 2024-12-02 RX ADMIN — SACUBITRIL AND VALSARTAN 1 TABLET: 24; 26 TABLET, FILM COATED ORAL at 17:13

## 2024-12-02 RX ADMIN — SPIRONOLACTONE 25 MG: 25 TABLET ORAL at 11:15

## 2024-12-02 RX ADMIN — SACUBITRIL AND VALSARTAN 1 TABLET: 24; 26 TABLET, FILM COATED ORAL at 05:28

## 2024-12-02 RX ADMIN — OMEPRAZOLE 20 MG: 20 CAPSULE, DELAYED RELEASE ORAL at 05:27

## 2024-12-02 RX ADMIN — HUMAN ALBUMIN MICROSPHERES AND PERFLUTREN 3 ML: 10; .22 INJECTION, SOLUTION INTRAVENOUS at 09:52

## 2024-12-02 RX ADMIN — ASPIRIN 81 MG: 81 TABLET, COATED ORAL at 05:26

## 2024-12-02 RX ADMIN — APIXABAN 5 MG: 5 TABLET, FILM COATED ORAL at 05:27

## 2024-12-02 RX ADMIN — ATORVASTATIN CALCIUM 80 MG: 80 TABLET, FILM COATED ORAL at 21:17

## 2024-12-02 ASSESSMENT — PAIN DESCRIPTION - PAIN TYPE
TYPE: ACUTE PAIN

## 2024-12-02 ASSESSMENT — HEART SCORE
ECG: NON-SPECIFIC REPOLARIZATION DISTURBANCE
HEART SCORE: 7
RISK FACTORS: >2 RISK FACTORS OR HX OF ATHEROSCLEROTIC DISEASE
AGE: 65+
TROPONIN: 1-3 TIMES NORMAL LIMIT
HISTORY: MODERATELY SUSPICIOUS

## 2024-12-02 ASSESSMENT — ENCOUNTER SYMPTOMS
GASTROINTESTINAL NEGATIVE: 1
PHOTOPHOBIA: 0
NEUROLOGICAL NEGATIVE: 1
MUSCULOSKELETAL NEGATIVE: 1
RESPIRATORY NEGATIVE: 1
WEIGHT LOSS: 0
PSYCHIATRIC NEGATIVE: 1
FEVER: 0

## 2024-12-02 NOTE — PROGRESS NOTES
[facility] Medicine Daily Progress Note    Date of Service  12/2/2024    Chief Complaint  Ramon Avery is a 74 y.o. male admitted 12/1/2024 with chest pain    Hospital Course  Ramon Avery is a 74 y.o. male who presented 12/1/2024 with chest pain.     Is a 74 male who presents to the emergency department complaints of chest pain.  He is a past medical history of CABG x 3 in 2016, paroxysmal atrial fibrillation, left bundle branch block, hypertension, hyperlipidemia, GERD.     He is seen in the ED at [place] yesterday with complaints of chest pain and shortness of breath.  Workup was complete demonstrating no PE or ACS.  He was diagnosed with pneumonia and discharged with prescription for antibiotics which he has not started.     Patient seen and examined at bedside.  Patient states that yesterday he was outside working on a tractor plowing field consistently looking over his right shoulder.  He states that he developed right neck pain that then radiated down into his right chest down his throat and eventually crossed into his left chest with associated shortness of breath and anxiety.  The patient presented to [facility] in [place] was diagnosed with pneumonia and sent home with pain medication and antibiotics.  He was unable to get these filled and given concern over possible heart problems he elected to be seen at [facility].  Patient states that he has not had a recurrence of this pain or shortness of breath.  Of note, patient quit taking his medications approximately 3 months ago only restarting yesterday.  Long discussion about compliance with medications being important to maintain healthy cardiac function and prevent stroke.     In the emergency department, slightly elevated WBC of 11.8 and neutrophils of 72.9.  CHEM panel relatively unremarkable.  Initial troponin 20, repeat 19.  proBNP 787.  EKG demonstrates atrial fibrillation with known left bundle branch block.  Chest x-ray demonstrates mild  cardiomegaly with no consolidations identified.     Patient to be admitted to the observation unit and monitored telemetry.  We will trend troponin and obtain echocardiogr    Interval Problem Update  Patient complaining of 6 out of 10 constant chest pressure.  Radiating  to the left arm.  Mild shortness of breath.  No nausea no vomiting no diaphoresis.  No fever no chills.    I cancelled the patient's  stress test order     I ordered nitroglycerin SL--->patient's chest pain resolved.  I consulted cardiology for evaluation.    Coronary angiogram on 12/3/2024    I have discussed this patient's plan of care and discharge plan at IDT rounds today with Case Management, Nursing, Nursing leadership, and other members of the IDT team.    Consultants/Specialty  cardiology    Code Status  Full Code    Disposition  The patient is not medically cleared for discharge to home or a post-acute facility.  Anticipate discharge to: home with close outpatient follow-up    I have placed the appropriate orders for post-discharge needs.    Review of Systems  Review of Systems   Constitutional:  Negative for fever and weight loss.   HENT: Negative.     Eyes:  Negative for photophobia.   Respiratory: Negative.     Cardiovascular:  Positive for chest pain.   Gastrointestinal: Negative.    Genitourinary: Negative.    Musculoskeletal: Negative.    Neurological: Negative.    Psychiatric/Behavioral: Negative.          Physical Exam  Temp:  [36.6 °C (97.9 °F)-37.7 °C (99.9 °F)] 37.5 °C (99.5 °F)  Pulse:  [52-94] 92  Resp:  [16-29] 20  BP: (107-139)/(55-72) 132/58  SpO2:  [89 %-96 %] 93 %    Physical Exam  Constitutional:       General: He is not in acute distress.     Appearance: He is not ill-appearing, toxic-appearing or diaphoretic.   HENT:      Head: Normocephalic and atraumatic.      Mouth/Throat:      Mouth: Mucous membranes are moist.   Cardiovascular:      Rate and Rhythm: Normal rate. Rhythm irregular.   Pulmonary:      Effort: No  respiratory distress.      Breath sounds: No stridor. No wheezing, rhonchi or rales.   Abdominal:      General: There is distension.      Palpations: There is no mass.      Tenderness: There is no rebound.   Musculoskeletal:      Right lower leg: No edema.      Left lower leg: No edema.   Skin:     Capillary Refill: Capillary refill takes 2 to 3 seconds.   Neurological:      General: No focal deficit present.      Mental Status: He is oriented to person, place, and time.      Cranial Nerves: No cranial nerve deficit.      Sensory: No sensory deficit.      Motor: No weakness.      Gait: Gait normal.      Deep Tendon Reflexes: Reflexes normal.   Psychiatric:         Mood and Affect: Mood normal.         Behavior: Behavior normal.         Fluids  No intake or output data in the 24 hours ending 12/02/24 1156     Laboratory  Recent Labs     12/01/24  1434 12/02/24  0205   WBC 11.8* 10.0   RBC 4.44* 4.16*   HEMOGLOBIN 14.6 13.4*   HEMATOCRIT 43.8 41.1*   MCV 98.6* 98.8*   MCH 32.9 32.2   MCHC 33.3 32.6   RDW 49.0 48.6   PLATELETCT 235 221   MPV 9.7 10.1     Recent Labs     12/01/24  1434 12/02/24  0205   SODIUM 136 139   POTASSIUM 4.0 4.2   CHLORIDE 102 103   CO2 22 26   GLUCOSE 107* 102*   BUN 17 22   CREATININE 0.85 0.91   CALCIUM 9.2 9.0             Recent Labs     12/02/24  0205   TRIGLYCERIDE 56   HDL 50   LDL 98       Imaging  EC-ECHOCARDIOGRAM COMPLETE W/ CONT   Final Result      DX-CHEST-PORTABLE (1 VIEW)   Final Result         1.  Mild cardiomegaly.      2.  No consolidations identified.       EKG reviewed by nsr, twave inversion in v5 and v6    Assessment/Plan  * Chest pain- (present on admission)  Assessment & Plan  The ASCVD Risk score (Tuan GIL, et al., 2019) failed to calculate for the following reasons:    Risk score cannot be calculated because patient has a medical history suggesting prior/existing ASCVD  Initial troponin negative, continue to trend  History of CABG, left bundle branch block and  paroxysmal A-fib  Monitor on telemetry      Aspirin beta-blocker, statin    Coronary angiogram on 12-3      HFrEF (heart failure with reduced ejection fraction) ([facility])- (present on admission)  Assessment & Plan  History of  Last echocardiogram demonstrates EF of approximately 35%    Repeat echo EF of 39%.   wall motion abnormality    Paroxysmal atrial fibrillation - postoperative from CABG and then on event monitor 3/2019- (present on admission)  Assessment & Plan  Monitor on telemetry  Continue home medication    S/P CABG x 3 5/2016 Dr Ryder for 3 vessel disease including left main aneurysm- (present on admission)  Assessment & Plan  History of  Continue home medication      Dyslipidemia- (present on admission)  Assessment & Plan  Continue home medication      Essential hypertension- (present on admission)  Assessment & Plan  Continue home medication  Monitor  As needed labetalol for SBP greater than 180     Greater than 36 minutes spent prepping to see patient (e.g. review of tests) obtaining and/or reviewing separately obtained history. Performing a medically appropriate examination and/ evaluation.  Counseling and educating the patient/family/caregiver.  Ordering medications, tests, or procedures.  Referring and communicating with other health care professionals.  Documenting clinical information in EPIC.  Independently interpreting results and communicating results to patient/family/caregiver.  Care coordination.       VTE prophylaxis: VTE Selection    I have performed a physical exam and reviewed and updated ROS and Plan today (12/2/2024). In review of yesterday's note (12/1/2024), there are no changes except as documented above.

## 2024-12-02 NOTE — ASSESSMENT & PLAN NOTE
History of  Last echocardiogram demonstrates EF of approximately 35%    Repeat echo EF of 39%.   wall motion abnormality  Cardiology consulted, restart Coreg, Farxiga, Entresto, Aldactone  Will need meds to bed on discharge  Cardiology will reach out to prescription coordinators to help get his medications covered outpatient

## 2024-12-02 NOTE — ED NOTES
Medication Reconciliation    Medication reconciliation is complete per patient reporting. Patient has not taken atorvastatin in the last 2 months, but reports he is supposed to restart it.  - Allergies reviewed.  - No outpatient antibiotics in the last 30 days.  - Patient is taking apixaban. Last dose taken on 12/1/24 AM.  - Patient's home pharmacy is [facility] in [place] [phone].    Shaista Butcher, Pharmacy Intern

## 2024-12-02 NOTE — PROGRESS NOTES
Patient transported to CDU by this RN on monitor, in room 211. Ambulatory from [facility] to bed, son at bedside. IV flushed and patent. On monitor, AFib HR 98 with PVCs and left BBB. Denies chest pain at this time, satting 95% on room air. Orders reviewed and released. Home medications locked in pharmacy

## 2024-12-02 NOTE — PROGRESS NOTES
Bedside report received from Brittany PETERS. Patient sitting up in bed, NPO for stress test, son Dany at bedside. Patient recalls chest pain he had at 0600, reports it is still 6/10, left side in origin, radiates to shoulder/axilla area. BP currently 136/76. On tele SR with PVCs HR 87. Continue to monitor

## 2024-12-02 NOTE — CARE PLAN
The patient is Watcher - Medium risk of patient condition declining or worsening    Shift Goals  Clinical Goals: tele monitoring, Stress test in AM, echo, NPO midnight  Patient Goals: get answers and rest  Family Goals: N/A    Progress made toward(s) clinical / shift goals:    Problem: Knowledge Deficit - Standard  Goal: Patient and family/care givers will demonstrate understanding of plan of care, disease process/condition, diagnostic tests and medications  Description: Target End Date:  1-3 days or as soon as patient condition allows    1.  Patient and family/caregiver oriented to unit, equipment, visitation policy and means for communicating concern  2.  Complete/review Learning Assessment  3.  Assess knowledge level of disease process/condition, treatment plan, diagnostic tests and medications  4.  Explain disease process/condition, treatment plan, diagnostic tests and medications  Outcome: Progressing     Problem: Safety  Goal: Will remain free from injury  Outcome: Progressing  Goal: Will remain free from falls  Outcome: Progressing     Problem: Pain Management  Goal: Pain level will decrease to patient's comfort goal  Outcome: Progressing     Problem: Venous Thromboembolism (VTW)/Deep Vein Thrombosis (DVT) Prevention:  Goal: Patient will participate in Venous Thrombosis (VTE)/Deep Vein Thrombosis (DVT)Prevention Measures  Outcome: Progressing     Problem: Medication  Goal: Compliance with prescribed medication will improve  Outcome: Progressing

## 2024-12-02 NOTE — PROGRESS NOTES
MD Allred reviewed EKG from 0600 and compared with previous EKGs. Order to hold stress test and give nitro x1. BP checked

## 2024-12-02 NOTE — CONSULTS
CARDIOLOGY CONSULTATION NOTE      Date of Consultation: 12/2/2024  Consulting Provider: Abdullahi Allred M.d.    Patient Name: Ramon Avery  YOB: 1950  MRN: [id number]    Reason for Consultation:   Chest pain    Assessment:   # Chest pain, possibly ischemic  # Coronary artery disease, 3V CABG (LIMA-LAD, SVG-OM, SVG-RCA)  # Ischemic cardiomyopathy  # Chronic LBBB  # Paroxysmal atrial fibrillation on chronic anticoagulation  # Hyperlipidemia, not controlled  # Hypertension  # History of CVA    Although the patient's troponin did not increase significantly, his symptoms are potentially ischemic and he has not had an ischemic evaluation since bypass surgery in 2016.  Additionally, he is a good candidate for upgrade to CRT given his known history of cardiomyopathy with an underlying LBBB despite medical therapy and he would require a repeat ischemic evaluation prior to considering CRT.    Recommendations:   -Hold further apixaban, make NPO at midnight, plan for cardiac catheterization tomorrow  -Increase atorvastatin to 80 mg nightly  -Continue aspirin 81 mg daily  -Continue carvedilol 3.125 mg twice daily  -Continue Entresto 50 mg twice daily  -Continue empagliflozin 10 mg daily  -Continue spironolactone 25 mg daily  -Pending results of cardiac catheterization would plan for outpatient CRT    Disposition:   Cardiology will continue to follow.  Keep NPO at midnight for cardiac catheterization tomorrow.    History of Present Illness:   Ramon Avery is a 74 year-old gentleman coronary artery disease with prior three-vessel CABG, ischemic cardiomyopathy with LVEF of 35%, chronic left bundle branch block, and paroxysmal atrial fibrillation.  Briefly, he presented 3 days ago to [facility] with right-sided neck pain that radiated into his chest and occurred after working on his tractor.  He had an unremarkable evaluation initially.  However, yesterday, he developed left-sided  "neck pain that radiated into his left arm, therefore, he was referred to [facility] for further evaluation.  His initial evaluation showed unremarkable troponins and an ECG with a chronic LBBB, but no obvious ischemic changes.  Cardiology was consulted for further recommendations.    Currently, he reports being chest pain-free.  However, he notes that his previous neck pain was highly concerning to him mainly because of the severity, which started at 3/10, but increased to 8/10.  He was also concerned because these pains radiated into his chest and left arm.    Medical History:     Past Medical History:   Diagnosis Date    Anesthesia     \"difficult to put to sleep\"    Anginal syndrome (HCC)     CAD    Arthritis     upper back    Dependence on supplemental oxygen     Heart burn     High cholesterol     Hypertension     Obesity     Paroxysmal atrial fibrillation (HCC)     Paroxysmal atrial fibrillation - postoperative from CABG     S/P CABG x 3 5/2016 Dr Ryder for 3 vessel disease including left main aneurysm      Surgical History:     Past Surgical History:   Procedure Laterality Date    OTHER CARDIAC SURGERY  5/12/16    CABG    MULTIPLE CORONARY ARTERY BYPASS ENDO VEIN HARVEST  5/12/2016    Procedure: MULTIPLE CORONARY ARTERY BYPASS X3,  ENDO VEIN HARVEST , SYLVIA AND PREVENA DRESSING ;  Surgeon: Bob Ryder M.D.;  Location: SURGERY [facility];  Service:     RECOVERY  5/9/2016    Procedure: CATH LAB [facility] WITH POSSIBLE DR. HE;  Surgeon: [facility];  Location: SURGERY PRE-POST PROC UNIT [facility];  Service:     OTHER  1990    Circumcision     Family History:     Family History   Problem Relation Age of Onset    Heart Disease Father      Social History:   The patient is a non-smoker    Medications and Allergies:     Current Facility-Administered Medications   Medication Dose Route Frequency Provider Last Rate Last Admin    nitroglycerin (Nitrostat) tablet 0.4 mg  0.4 mg Sublingual Q5 MIN PRN Abdullahi Allred, " M.D.   0.4 mg at 12/02/24 0811    apixaban (Eliquis) tablet 5 mg  5 mg Oral BID BETTYE Vazquez.P.R.N.   5 mg at 12/02/24 0527    atorvastatin (Lipitor) tablet 40 mg  40 mg Oral Nightly BETTYE Vazquez.P.R.NPancho        carvedilol (Coreg) tablet 3.125 mg  3.125 mg Oral BID WITH MEALS DEA VazquezP.RBRYAN        sacubitril-valsartan (Entresto) 24-26 MG 1 Tablet  1 Tablet Oral BID DEA VazquezP.R.N.   1 Tablet at 12/02/24 0528    regadenoson (Lexiscan) injection SOLN 0.4 mg  0.4 mg Intravenous Once PRN BETTYE Vazquez.P.R.N.        aminophylline injection 100 mg  100 mg Intravenous Q5 MIN PRN BETTYE Vazquez.P.R.NPancho        acetaminophen (Tylenol) tablet 650 mg  650 mg Oral Q6HRS PRN BETTYE Vazquez.P.R.NPancho        aspirin EC tablet 81 mg  81 mg Oral DAILY BETTYE Vazquez.P.R.N.   81 mg at 12/02/24 0526    labetalol (Normodyne/Trandate) injection 10 mg  10 mg Intravenous Q4HRS PRN BETTYE Vazquez.P.R.N.        ondansetron (Zofran) syringe/vial injection 4 mg  4 mg Intravenous Q4HRS PRN BETTYE Vazquez.P.R.N.        ondansetron (Zofran ODT) dispertab 4 mg  4 mg Oral Q4HRS PRN BETTYE Vazquez.P.R.NPancho        omeprazole (PriLOSEC) capsule 20 mg  20 mg Oral DAILY BETTYE Vazquez.P.R.N.   20 mg at 12/02/24 0527    mag hydrox-al hydrox-simeth (Maalox Plus Es Or Mylanta Ds) suspension 10 mL  10 mL Oral 4X/DAY PRN TALYA VazquezRPanchoN.        cyclobenzaprine (Flexeril) tablet 10 mg  10 mg Oral TID PRN TALYA VazquezRJEFF.         Allergies   Allergen Reactions    Ney Swelling     Lip blistering and swelling     Review of Systems:   A pertinent review of systems was performed and was unremarkable except as per HPI above.    Vital Signs:   /58   Pulse 92   Temp 37.5 °C (99.5 °F) (Temporal)   Resp 20   Ht 1.829 m (6')   Wt 122 kg (269 lb 10 oz)   SpO2 93%   BMI 36.57 kg/m²   Vitals:    12/02/24 0730 12/02/24 0800  12/02/24 0811 12/02/24 0819   BP:   139/72 132/58   Pulse: 71  92 92   Resp:       Temp:       TempSrc:       SpO2:  93%     Weight:       Height:         Body mass index is 36.57 kg/m².  Oxygen Therapy:  Pulse Oximetry: 93 %, O2 (LPM): 2, O2 Delivery Device: Nasal Cannula    Physical Examination:   General: Well-appearing, no acute distress  Eyes: Extraocular movements intact, anicteric  HENT: Neck full range of motion, no jugular venous distension  Pulmonary: Normal respiratory effort, no distress  Cardiovascular: Regular rate, regular rhythm  Gastrointestinal: Obese, non-distended  Extremities: Warm and well perfused, no significant lower extremity edema  Neurological: Alert and oriented, no gross focal motor deficits  Psychiatric: Appropriate affect, normal judgment    Laboratories:   Estimated Creatinine Clearance: 99 mL/min (by C-G formula based on SCr of 0.91 mg/dL).  Recent Labs     12/01/24  1434 12/02/24  0205 12/02/24  0752   CREATININE 0.85 0.91  --    BUN 17 22  --    POTASSIUM 4.0 4.2  --    SODIUM 136 139  --    CALCIUM 9.2 9.0  --    MAGNESIUM  --   --  2.2   CO2 22 26  --    ALBUMIN 4.4  --   --      Recent Labs     12/01/24  1434 12/02/24  0205   GLUCOSE 107* 102*     Recent Labs     12/01/24  1434   ASTSGOT 12   ALTSGPT 15   ALKPHOSPHAT 62     Recent Labs     12/01/24  1434 12/02/24  0205   WBC 11.8* 10.0   HEMOGLOBIN 14.6 13.4*   PLATELETCT 235 221     Recent Labs     12/01/24  1434 12/01/24  1725 12/01/24  2156 12/02/24  0205   TROPONINT 20* 19 18  --    NTPROBNP 787*  --   --   --    HBA1C  --   --   --  6.2*     Lab Results   Component Value Date/Time    LDL 98 12/02/2024 0205     Lab Results   Component Value Date/Time    HDL 50 12/02/2024 0205       Lab Results   Component Value Date/Time    TRIGLYCERIDE 56 12/02/2024 0205       Lab Results   Component Value Date/Time    CHOLSTRLTOT 159 12/02/2024 0205     Studies:   Echocardiography  I personally reviewed and interpreted the patient's  echocardiogram, which demonstrates moderately reduced left ventricular systolic function with an estimated LVEF of 35-40%, significant septal dyssynchrony consistent with LBBB, no significant valvular abnormalities, and a mild to moderate ascending aortic aneurysm.    X-ray/CT/MRI  I personally reviewed and interpreted the patient's chest x-ray, which demonstrates no acute cardiopulmonary disease.    Electrophysiology  I personally reviewed and interpret the patient's ECG, which demonstrates sinus rhythm with first-degree AV block and a chronic LBBB          Young Olson MD, [facility]  Interventional Cardiology  [facility] Heart and Vascular [facility] Advanced Medicine, [address]ld[address]  [address] [address]  Phone: [phone]  Fax: [phone]

## 2024-12-02 NOTE — PROGRESS NOTES
4 Eyes Skin Assessment Completed by FRAN Ying and FRAN Jones.     Head WDL  Ears WDL  Nose WDL  Mouth WDL  Neck WDL  Breast/Chest WDL  Shoulder Blades WDL  Spine WDL  (R) Arm/Elbow/Hand WDL  (L) Arm/Elbow/Hand WDL  Abdomen WDL  Groin WDL  Scrotum/Coccyx/Buttocks WDL  (R) Leg WDL  (L) Leg WDL  (R) Heel/Foot/Toe WDL  (L) Heel/Foot/Toe WDL          Devices In Places Blood Pressure Cuff      Interventions In Place Pillows    Possible Skin Injury No    Pictures Uploaded Into Epic N/A  Wound Consult Placed N/A  RN Wound Prevention Protocol Ordered No

## 2024-12-02 NOTE — PROGRESS NOTES
"Pt called RN to bedside stating onset of chest pain 6/10 that radiated to his neck and L shoulder, pt states \"when my heart beats the pain intensifies\". Pt also has complaints of difficulty taking a deep breath. GREGG Dickinson notified.     0606: monitors notified RN pt had 7 beats of Vtach. GREGG made aware, strip sent to GREGG.   "

## 2024-12-02 NOTE — PROGRESS NOTES
Nitro administered x1, patient reports improvement in chest pain from 6/10 to 3/10. BP and HR stable. Plan to defer stress test at this time, patient aware. MD Allred aware of improvement in patients chest pain. Patient was placed on 2L 02 during the night for hypoxia, currently satting 95% on 2L 02.

## 2024-12-02 NOTE — ED PROVIDER NOTES
Port Insertion Discharge Instructions     After you go home:      Have an adult stay with you for the first 6 hours    You may resume your normal diet       For 24 hours - due to the sedation you received:    Relax and take it easy    Do NOT make any important or legal decisions    Do NOT drive or operate machines at home or at work    Do NOT drink alcohol    Care of Puncture Sites:      Keep the dressings on your sites clean & dry for 3 days. Change it only if it gets wet or dirty.    You may shower after the dressing comes off in 3 days    Do not remove the small white strips of tape, if present. Allow them to fall off on their own.     You may cover the wound with a bandaid after the dressing is removed if needed for comfort      Activity       Avoid heavy lifting (greater than 10 pounds) or the overuse of your shoulder for 3 days    Bleeding:      If you start bleeding from the incision sites in your chest or neck - or have swelling in your neck, sit down and press on the site for 5-10 minutes.     If bleeding has not stopped after 10 minutes, call your provider.        Call 911 right away if you have heavy bleeding or bleeding that does not stop.      Medicines:      You may resume all medications    Resume your Warfarin/Coumadin at your regular dose today. Follow up with your provider to have your INR rechecked    Resume your Platelet Inhibitors and Aspirin tomorrow at your regular dose    For minor pain, you may take Acetaminophen (Tylenol) or Ibuprofen (Advil)    Call the provider who ordered this procedure if:      You have swelling in your neck or over your port site    The incision area is red, swollen, hot or tender    You have chills or a fever greater than 101 F (38 C)    Any questions or concerns    Call  911 or go to the Emergency Room if you have:      Severe chest pain or trouble breathing    Bleeding that you cannot control    Additional Information:      Your port may be accessed right away.  ED Provider Note    CHIEF COMPLAINT  Chief Complaint   Patient presents with    Chest Pain     Pt c/o pain that started in his neck and moved down to his chest.  Pt states the pain was so severe that he was short of breath.  Pt was seen at [facility] yesterday and they ruled out blood clots and heart attack.  Pt was diagnosed with pneumonia and prescribed antibiotics.  Pt states he is still having chest pain and shortness of breath so he came here.       EXTERNAL RECORDS REVIEWED  Patient is followed by Dr. Gibson with cardiology and last saw him 11/22/23, and he had a CABG in 2016. Patient has paroxsymal A fib and is on Eliquis. He has ischemic cardiomyopathy. Patient's last echo was done in May of 2023. No prior stress test in our system.     Reviewed paper records from [facility] yesterday.  CTA of the chest was limited by artifact however negative for large pulmonary embolism.  He was discharged home on doxycycline for possible pneumonia.    HPI/ROS  LIMITATION TO HISTORY   Select: : None  OUTSIDE HISTORIAN(S):  Family is present at bedside and helped provide patient's history today, as noted below.     Ramon Avery is a 74 y.o. male who presents to the Emergency Department with chest pain onset 5:30 yesterday afternoon. Patient describes his initial pain as similar to pulling a muscle in his right neck. His pain then radiated down and into the back of his neck, then into his chest and across the left side of his chest. Patient reports that his pain greatly worsened last night, resulting in body shakes and shortness of breath. He was then seen at [facility] last night, and had a CT scan of his chest performed, which ruled out PE. He also did not have any evidence of myocardial infarction, and was told to present to the [facility] ED of his pain worsened. He denies any current pain, but presents here today for concern of his symptoms last night. His son reports that his shortness of breath was      You will need to have your port flushed every 30 days or after each use.      If you have questions call:          [facility] Radiology Dept @ [phone]         "concerning him last night, and limited the patient's ability to get around his home. Patient notes that he has chronic intermittent lightheadedness without recent change, and he denies any lower extremity swelling. Patient has a significant cardiac history of bypass surgery in 2016, and stroke in 2022. He also has A fib, and has not had any stents placed. Told he needed pacemaker but deferred that treatment. He sees Dr. Gibson with cardiology and is medications. Patient stopped taking his medications for two months, but has began taking them again this morning. He has not taken any aspirin today.     PAST MEDICAL HISTORY  Past Medical History:   Diagnosis Date    Anesthesia     \"difficult to put to sleep\"    Anginal syndrome (HCC)     CAD    Arthritis     upper back    Dependence on supplemental oxygen     Heart burn     High cholesterol     Hypertension     Obesity     Paroxysmal atrial fibrillation (HCC)     Paroxysmal atrial fibrillation - postoperative from CABG     S/P CABG x 3 5/2016 Dr Ryder for 3 vessel disease including left main aneurysm         SURGICAL HISTORY  Past Surgical History:   Procedure Laterality Date    OTHER CARDIAC SURGERY  5/12/16    CABG    MULTIPLE CORONARY ARTERY BYPASS ENDO VEIN HARVEST  5/12/2016    Procedure: MULTIPLE CORONARY ARTERY BYPASS X3,  ENDO VEIN HARVEST , SYLVIA AND PREVENA DRESSING ;  Surgeon: Bob Ryder M.D.;  Location: SURGERY [facility];  Service:     RECOVERY  5/9/2016    Procedure: CATH LAB [facility] WITH POSSIBLE DR. HE;  Surgeon: [facility];  Location: SURGERY PRE-POST PROC UNIT [facility];  Service:     OTHER  1990    Circumcision        FAMILY HISTORY  Family History   Problem Relation Age of Onset    Heart Disease Father        SOCIAL HISTORY   reports that he has never smoked. He has never used smokeless tobacco. He reports that he does not drink alcohol and does not use drugs.    CURRENT MEDICATIONS  Previous Medications    APIXABAN (ELIQUIS) 5MG TAB  " "  Take 1 Tablet by mouth 2 times a day.    ATORVASTATIN (LIPITOR) 40 MG TAB    TAKE 1 TABLET BY MOUTH EVERY EVENING    ATORVASTATIN (LIPITOR) 80 MG TABLET    Take 0.5 Tablets by mouth every evening.    B COMPLEX VITAMINS (B COMPLEX PO)    Take 1 Capful by mouth every day.    CARVEDILOL (COREG) 3.125 MG TAB    Take 1 Tablet by mouth 2 times a day with meals.    EMPAGLIFLOZIN (JARDIANCE) 10 MG TAB TABLET    Take 1 Tablet by mouth every day.    FUROSEMIDE (LASIX) 20 MG TAB    Take 1 Tablet by mouth 1 time a day as needed (swelling).    MULTIPLE VITAMINS-MINERALS (MENS MULTIVITAMIN PO)    Take 1 Tablet by mouth every day.    NON SPECIFIED    Take 1 Dose by mouth every day. \"Vitamin D3 and vitamin C supplement\"    SACUBITRIL-VALSARTAN (ENTRESTO) 24-26 MG TAB    Take 1 Tablet by mouth 2 times a day. Must complete labs and keep visit scheduled 11/2/2023    SPIRONOLACTONE (ALDACTONE) 25 MG TAB    Take 1 Tablet by mouth every day.       ALLERGIES  [place]    PHYSICAL EXAM  /57   Pulse 94   Temp 36.6 °C (97.9 °F) (Temporal)   Resp 18   Ht 1.829 m (6')   Wt 121 kg (266 lb 8.6 oz)   SpO2 91%    Constitutional: Nontoxic appearing. Alert in no apparent distress.  HENT: Normocephalic, Atraumatic. Bilateral external ears normal. Nose normal.  Moist mucous membranes.  Oropharynx clear.  Eyes: Pupils are equal and reactive. Conjunctiva normal.   Neck: Supple, full range of motion  Heart: Regular rate and rhythm.  No murmurs.    Lungs: No respiratory distress, normal work of breathing. Lungs clear to auscultation bilaterally.  Abdomen Soft, no distention.  No tenderness to palpation.  Musculoskeletal: Atraumatic. No obvious deformities noted.  Trace lower extremity pitting edema bilaterally.   Skin: Warm, Dry.  No erythema, No rash.   Neurologic: Alert and oriented x3. Moving all extremities spontaneously without focal deficits.  Psychiatric: Affect normal, Mood normal, Appears appropriate and not intoxicated. "     DIAGNOSTIC STUDIES / PROCEDURES    EKG  I have independently interpreted this EKG  Results for orders placed or performed during the hospital encounter of 24   EKG   Result Value Ref Range    Report       [facility] Emergency Dept.    Test Date:  2024  Pt Name:    BEBETO JIMENEZ                 Department: ER  MRN:        [id number]                      Room:  Gender:     Male                         Technician: [id number]  :        1950                   Requested By:ER TRIAGE PROTOCOL  Order #:    [id number]                    Reading MD: Rupal Garcia MD    Measurements  Intervals                                Axis  Rate:       66                           P:          0  WI:         0                            QRS:        125  QRSD:       164                          T:          -51  QT:         407  QTc:        427    Interpretive Statements  Atrial fibrillation  Left bundle branch block unchanged  No STEMI  Compared to ECG 2022 08:56:01  Sinus rhythm no longer present  Otherwise no significant change    Electronically Signed On 2024 16:37:28 PST by Rupal Garcia MD         LABS  Labs Reviewed   CBC WITH DIFFERENTIAL - Abnormal; Notable for the following components:       Result Value    WBC 11.8 (*)     RBC 4.44 (*)     MCV 98.6 (*)     Neutrophils-Polys 72.90 (*)     Lymphocytes 11.80 (*)     Monocytes 14.30 (*)     Neutrophils (Absolute) 8.61 (*)     Monos (Absolute) 1.69 (*)     All other components within normal limits   COMP METABOLIC PANEL - Abnormal; Notable for the following components:    Glucose 107 (*)     All other components within normal limits   PROBRAIN NATRIURETIC PEPTIDE, NT - Abnormal; Notable for the following components:    NT-proBNP 787 (*)     All other components within normal limits   TROPONIN - Abnormal; Notable for the following components:    Troponin T 20 (*)     All other components within normal limits   ESTIMATED GFR   TROPONIN          RADIOLOGY  I have independently interpreted the diagnostic imaging associated with this visit and am waiting the final reading from the radiologist.   My preliminary interpretation is as follows: No infiltrate    Radiologist interpretation:  DX-CHEST-PORTABLE (1 VIEW)   Final Result         1.  Mild cardiomegaly.      2.  No consolidations identified.            COURSE & MEDICAL DECISION MAKING    4:22 PM - Patient seen and examined at bedside. Patient presents today with chest pain and shortness of breath last night. He has a concerning cardiac history and was off of his medications for two months, and began taking them again this morning. Discussed plan of care, including performing a cardiac workup via EKG, lab work, and imaging. Patient agrees to the plan of care. Ordered for EKG, Troponin now, BNP, CMP, CBC with differential, Troponin in two hours, and DX-Chest to evaluate his symptoms.     ASSESSMENT, COURSE AND PLAN  Care Narrative: Patient with history of CAD and prior CABG who presents following an episode of chest pain which occurred yesterday.  He was seen at an outside hospital with an unremarkable workup however due to ongoing concerns for possible cardiac etiology he presented here.  He is afebrile with normal vital signs on arrival.  Pain is minimal at this time.  His EKG shows chronic left bundle branch block with associated atrial fibrillation.  No signs of acute ischemia.  Troponin is borderline elevated at 20.  Patient had a CTA of his chest yesterday which was limited due to motion artifact however did not show any signs of large pulmonary embolism.  I think that this is unlikely as his symptoms have resolved.  BNP is mildly elevated however chest x-ray does not show evidence of pulmonary edema or significant signs of decompensated heart failure.  Due to his significant cardiac history and the fact that he has been off medications for the last few months, I do feel he would benefit from  stress test and repeat echocardiogram.    5:32 PM - I discussed the patient's case and the above findings with admitting hospitalist GREGG who agrees with the plan for admission.      5:33 PM - Upon reassessment, patient is resting comfortably with normal vital signs.  No new complaints at this time.  Discussed results with patient and/or family as well as the plan of care for admission. Patient verbalizes understanding and agreement to this plan of care.    ADDITIONAL PROBLEM LIST  Problem #1: Acute chest pain - HEART score 7, plan for admission for stress test and echocardiogram      DISPOSITION AND DISCUSSIONS  I have discussed management of the patient with the following physicians and VAHE's:    Nick ESCOBEDO.         DISPOSITION:  Patient will be hospitalized by Dr. Allred in guarded condition.     FINAL DIAGNOSIS  1. Chest pain, unspecified type    2. History of CAD (coronary artery disease)        The note accurately reflects work and decisions made by me.  Rupal Garcia M.D.  12/2/2024  12:26 AM     Corey TAYLOR (Frediblilli), am scribing for, and in the presence of, Rupal Garcia M.D..    Electronically signed by: Corey Kelly (Marychuy), 12/1/2024    Rupal TAYLOR M.D. personally performed the services described in this documentation, as scribed by Corey Kelly in my presence, and it is both accurate and complete.

## 2024-12-02 NOTE — HOSPITAL COURSE
Ramon Avery is a 74 y.o. male who presented 12/1/2024 with chest pain.     Is a 74 male who presents to the emergency department complaints of chest pain.  He is a past medical history of CABG x 3 in 2016, paroxysmal atrial fibrillation, left bundle branch block, hypertension, hyperlipidemia, GERD.     He is seen in the ED at [facility] yesterday with complaints of chest pain and shortness of breath.  Workup was complete demonstrating no PE or ACS.  He was diagnosed with pneumonia and discharged with prescription for antibiotics which he has not started.     Patient seen and examined at bedside.  Patient states that yesterday he was outside working on a tractor plowing field consistently looking over his right shoulder.  He states that he developed right neck pain that then radiated down into his right chest down his throat and eventually crossed into his left chest with associated shortness of breath and anxiety.  The patient presented to [facility] in [place] was diagnosed with pneumonia and sent home with pain medication and antibiotics.  He was unable to get these filled and given concern over possible heart problems he elected to be seen at [facility].  Patient states that he has not had a recurrence of this pain or shortness of breath.  Of note, patient quit taking his medications approximately 3 months ago only restarting yesterday.  Long discussion about compliance with medications being important to maintain healthy cardiac function and prevent stroke.     In the emergency department, slightly elevated WBC of 11.8 and neutrophils of 72.9.  CHEM panel relatively unremarkable.  Initial troponin 20, repeat 19.  proBNP 787.  EKG demonstrates atrial fibrillation with known left bundle branch block.  Chest x-ray demonstrates mild cardiomegaly with no consolidations identified.     Patient to be admitted to the observation unit and monitored telemetry.  We will trend troponin and obtain echocardiogr

## 2024-12-02 NOTE — H&P
Hospital Medicine History & Physical Note    Date of Service  12/1/2024    Primary Care Physician  Mathew Tipton M.D.    Code Status  Full Code    Chief Complaint  Chief Complaint   Patient presents with    Chest Pain     Pt c/o pain that started in his neck and moved down to his chest.  Pt states the pain was so severe that he was short of breath.  Pt was seen at [facility] yesterday and they ruled out blood clots and heart attack.  Pt was diagnosed with pneumonia and prescribed antibiotics.  Pt states he is still having chest pain and shortness of breath so he came here.         History of Presenting Illness  Ramon Avery is a 74 y.o. male who presented 12/1/2024 with chest pain.    Is a 74 male who presents to the emergency department complaints of chest pain.  He is a past medical history of CABG x 3 in 2016, paroxysmal atrial fibrillation, left bundle branch block, hypertension, hyperlipidemia, GERD.    He is seen in the ED at [facility] yesterday with complaints of chest pain and shortness of breath.  Workup was complete demonstrating no PE or ACS.  He was diagnosed with pneumonia and discharged with prescription for antibiotics which he has not started.    Patient seen and examined at bedside.  Patient states that yesterday he was outside working on a tractor plAudiodrafting field consistently looking over his right shoulder.  He states that he developed right neck pain that then radiated down into his right chest down his throat and eventually crossed into his left chest with associated shortness of breath and anxiety.  The patient presented to [facility] in [place] was diagnosed with pneumonia and sent home with pain medication and antibiotics.  He was unable to get these filled and given concern over possible heart problems he elected to be seen at [facility].  Patient states that he has not had a recurrence of this pain or shortness of breath.  Of note, patient quit taking his medications approximately 3 months  ago only restarting yesterday.  Long discussion about compliance with medications being important to maintain healthy cardiac function and prevent stroke.    In the emergency department, slightly elevated WBC of 11.8 and neutrophils of 72.9.  CHEM panel relatively unremarkable.  Initial troponin 20, repeat 19.  proBNP 787.  EKG demonstrates atrial fibrillation with known left bundle branch block.  Chest x-ray demonstrates mild cardiomegaly with no consolidations identified.    Patient to be admitted to the observation unit and monitored telemetry.  We will trend troponin and obtain echocardiogram and cardiac stress test in AM.    I discussed the plan of care with patient and ERP and collaborating physician .    Review of Systems  Review of Systems   Constitutional: Negative.    HENT:  Positive for hearing loss.    Eyes: Negative.    Respiratory: Negative.     Cardiovascular:  Negative for chest pain and palpitations.   Gastrointestinal: Negative.    Genitourinary: Negative.    Musculoskeletal: Negative.    Skin: Negative.    Neurological: Negative.    Endo/Heme/Allergies: Negative.    Psychiatric/Behavioral: Negative.     All other systems reviewed and are negative.      Past Medical History   has a past medical history of Anesthesia, Anginal syndrome (HCC), Arthritis, Dependence on supplemental oxygen, Heart burn, High cholesterol, Hypertension, Obesity, Paroxysmal atrial fibrillation (HCC), Paroxysmal atrial fibrillation - postoperative from CABG, and S/P CABG x 3 5/2016 Dr Ryder for 3 vessel disease including left main aneurysm.    Surgical History   has a past surgical history that includes recovery (5/9/2016); other (1990); other cardiac surgery (5/12/16); and multiple coronary artery bypass endo vein harvest (5/12/2016).     Family History  family history includes Heart Disease in his father.   Family history reviewed with patient. There is family history that is pertinent to the chief complaint.     Social  "History   reports that he has never smoked. He has never used smokeless tobacco. He reports that he does not drink alcohol and does not use drugs.    Allergies  Allergies   Allergen Reactions    [place] Swelling     Lip blistering and swelling       Medications  Prior to Admission Medications   Prescriptions Last Dose Informant Patient Reported? Taking?   B Complex Vitamins (B COMPLEX PO) 12/1/2024 Morning Patient Yes Yes   Sig: Take 1 Capful by mouth every day.   Empagliflozin (JARDIANCE) 10 MG Tab tablet 12/1/2024 Morning Patient No Yes   Sig: Take 1 Tablet by mouth every day.   Multiple Vitamins-Minerals (MENS MULTIVITAMIN PO) 12/1/2024 Morning Patient Yes Yes   Sig: Take 1 Tablet by mouth every day.   NON SPECIFIED 12/1/2024 Morning Patient Yes Yes   Sig: Take 1 Dose by mouth every day. \"Vitamin D3 and vitamin C supplement\"   apixaban (ELIQUIS) 5mg Tab 12/1/2024 Morning Patient No Yes   Sig: Take 1 Tablet by mouth 2 times a day.   atorvastatin (LIPITOR) 40 MG Tab Not Taking Patient No No   Sig: TAKE 1 TABLET BY MOUTH EVERY EVENING   Patient not taking: Reported on 12/1/2024   atorvastatin (LIPITOR) 80 MG tablet Not Taking Patient No No   Sig: Take 0.5 Tablets by mouth every evening.   Patient not taking: Reported on 12/1/2024   carvedilol (COREG) 3.125 MG Tab 12/1/2024 Morning Patient No Yes   Sig: Take 1 Tablet by mouth 2 times a day with meals.   furosemide (LASIX) 20 MG Tab Not Taking Patient No No   Sig: Take 1 Tablet by mouth 1 time a day as needed (swelling).   Patient not taking: Reported on 12/1/2024   sacubitril-valsartan (ENTRESTO) 24-26 MG Tab 12/1/2024 Morning Patient No Yes   Sig: Take 1 Tablet by mouth 2 times a day. Must complete labs and keep visit scheduled 11/2/2023   spironolactone (ALDACTONE) 25 MG Tab Not Taking Patient No No   Sig: Take 1 Tablet by mouth every day.   Patient not taking: Reported on 12/1/2024      Facility-Administered Medications: None       Physical Exam  Temp:  [36.6 °C " (97.9 °F)-37.5 °C (99.5 °F)] 37.5 °C (99.5 °F)  Pulse:  [55-94] 55  Resp:  [16-29] 18  BP: (118-134)/(56-60) 134/56  SpO2:  [90 %-94 %] 90 %  Blood Pressure : 125/60   Temperature: 36.6 °C (97.9 °F)   Pulse: 81   Respiration: (!) 29   Pulse Oximetry: 90 %       Physical Exam  Vitals and nursing note reviewed.   Constitutional:       Appearance: He is obese.   HENT:      Head: Normocephalic.      Nose: Nose normal.      Mouth/Throat:      Mouth: Mucous membranes are moist.   Eyes:      Extraocular Movements: Extraocular movements intact.      Pupils: Pupils are equal, round, and reactive to light.   Cardiovascular:      Rate and Rhythm: Normal rate. Rhythm irregular.      Pulses: Normal pulses.      Heart sounds: Normal heart sounds.   Pulmonary:      Effort: Pulmonary effort is normal.      Breath sounds: Normal breath sounds.   Abdominal:      General: Bowel sounds are normal.      Palpations: Abdomen is soft.   Musculoskeletal:         General: Normal range of motion.      Cervical back: Normal range of motion.   Skin:     General: Skin is warm.      Capillary Refill: Capillary refill takes 2 to 3 seconds.   Neurological:      General: No focal deficit present.      Mental Status: He is alert and oriented to person, place, and time. Mental status is at baseline.   Psychiatric:         Mood and Affect: Mood normal.         Behavior: Behavior normal.         Thought Content: Thought content normal.         Judgment: Judgment normal.         Laboratory:  Recent Labs     12/01/24  1434   WBC 11.8*   RBC 4.44*   HEMOGLOBIN 14.6   HEMATOCRIT 43.8   MCV 98.6*   MCH 32.9   MCHC 33.3   RDW 49.0   PLATELETCT 235   MPV 9.7     Recent Labs     12/01/24  1434   SODIUM 136   POTASSIUM 4.0   CHLORIDE 102   CO2 22   GLUCOSE 107*   BUN 17   CREATININE 0.85   CALCIUM 9.2     Recent Labs     12/01/24  1434   ALTSGPT 15   ASTSGOT 12   ALKPHOSPHAT 62   TBILIRUBIN 0.9   GLUCOSE 107*         Recent Labs     12/01/24  1434   NTPROBNP  787*         Recent Labs     12/01/24  1434 12/01/24  1725   TROPONINT 20* 19       Imaging:  DX-CHEST-PORTABLE (1 VIEW)   Final Result         1.  Mild cardiomegaly.      2.  No consolidations identified.      NM-CARDIAC STRESS TEST    (Results Pending)   EC-ECHOCARDIOGRAM COMPLETE W/O CONT    (Results Pending)       X-Ray:  I have personally reviewed the images and compared with prior images.  EKG:  I have personally reviewed the images and compared with prior images.    Assessment/Plan:  Justification for Admission Status  I anticipate this patient is appropriate for observation status at this time because chest pain R/O    Patient will need a Telemetry bed on MEDICAL service .  The need is secondary to chest pain.    * Chest pain- (present on admission)  Assessment & Plan  The ASCVD Risk score (Tuan GIL, et al., 2019) failed to calculate for the following reasons:    Risk score cannot be calculated because patient has a medical history suggesting prior/existing ASCVD  Initial troponin negative, continue to trend  History of CABG, left bundle branch block and paroxysmal A-fib  Monitor on telemetry  Echocardiogram and cardiac stress test  EKG as needed for chest pain      HFrEF (heart failure with reduced ejection fraction) (HCC)- (present on admission)  Assessment & Plan  History of  Last echocardiogram demonstrates EF of approximately 35%  Repeat echo    Paroxysmal atrial fibrillation - postoperative from CABG and then on event monitor 3/2019- (present on admission)  Assessment & Plan  Monitor on telemetry  Continue home medication    S/P CABG x 3 5/2016 Dr Ryder for 3 vessel disease including left main aneurysm- (present on admission)  Assessment & Plan  History of  Continue home medication      Dyslipidemia- (present on admission)  Assessment & Plan  Continue home medication  Lipid panel in a.m.    Essential hypertension- (present on admission)  Assessment & Plan  Continue home medication  Monitor  As needed  labetalol for SBP greater than 180        VTE prophylaxis: SCDs/TEDs and therapeutic anticoagulation with Eliquis

## 2024-12-02 NOTE — DISCHARGE PLANNING
Met with pt and son Dany was at the bedside. Pt said he is [place] and read lips. Son Dany said pt lives alone but Dany lives on same property. Pt does not use any DMEs. No home O2. He has no HH services.     PCP is Dr. Mathew Tipton   Pharmacy is [facility] in [place]   Son can help with transportation upon discharge.     Care Transition Team Assessment    Information Source  Orientation Level: Oriented X4  Information Given By: Patient  Who is responsible for making decisions for patient? : Patient    Readmission Evaluation  Is this a readmission?: No    Elopement Risk  Legal Hold: No  Ambulatory or Self Mobile in Wheelchair: Yes  Disoriented: No  Psychiatric Symptoms: None  History of Wandering: No  Elopement this Admit: No  Vocalizing Wanting to Leave: No  Displays Behaviors, Body Language Wanting to Leave: No-Not at Risk for Elopement  Elopement Risk: Not at Risk for Elopement    Interdisciplinary Discharge Planning  Does Admitting Nurse Feel This Could be a Complex Discharge?: No  Primary Care Physician: Dr. Mathew Tipton  Lives with - Patient's Self Care Capacity: Alone and Able to Care For Self  Patient or legal guardian wants to designate a caregiver: No  Support Systems: Children  Housing / Facility: [address]  Do You Take your Prescribed Medications Regularly: Yes  Able to Return to Previous ADL's: Yes  Mobility Issues: No  Prior Services: Home-Independent  Patient Prefers to be Discharged to:: Home  Assistance Needed: No    Discharge Preparedness  What is your plan after discharge?: Home with help  What are your discharge supports?: Child  Prior Functional Level: Ambulatory, Drives Self, Independent with Activities of Daily Living, Independent with Medication Management  Difficulity with ADLs: None  Difficulity with IADLs: None    Functional Assesment  Prior Functional Level: Ambulatory, Drives Self, Independent with Activities of Daily Living, Independent with Medication  Management    Finances  Financial Barriers to Discharge: No  Prescription Coverage: Yes    Vision / Hearing Impairment  Vision Impairment : Yes  Right Eye Vision: Impaired, Wears Glasses  Left Eye Vision: Impaired, Wears Glasses  Hearing Impairment : Yes  Hearing Impairment: Both Ears, Hearing Device(s) Available  Does Pt Need Special Equipment for the Hearing Impaired?: No    Values / Beliefs / Concerns  Values / Beliefs Concerns : No    Advance Directive  Advance Directive?: None    Domestic Abuse  Have you ever been the victim of abuse or violence?: No  Possible Abuse/Neglect Reported to:: Not Applicable         Discharge Risks or Barriers  Discharge risks or barriers?: No  Patient risk factors: Cognitive / sensory / physical deficit    Anticipated Discharge Information  Discharge Disposition: Discharged to home/self care (01)

## 2024-12-02 NOTE — PROGRESS NOTES
Echo tech to bedside. MD Allred made aware of 7 beat run of Vtach patient had this morning, STAT mag level ordered, lab notified

## 2024-12-02 NOTE — PROGRESS NOTES
Monitor summary: per monitor room interpretation  Sinus rhythm / Afib Rate 52-89 pt converted to SR   F PAC / R PVC  ectopy  0.21/0.14/0.35    **Monitor strips are in pt hard chart

## 2024-12-02 NOTE — ASSESSMENT & PLAN NOTE
The ASCVD Risk score (Tuan GIL, et al., 2019) failed to calculate for the following reasons:    Risk score cannot be calculated because patient has a medical history suggesting prior/existing ASCVD  Initial troponin negative, continue to trend  History of CABG, left bundle branch block and paroxysmal A-fib  Monitor on telemetry    Aspirin beta-blocker, statin  Cardiology consulted plan for cardiac cath today

## 2024-12-03 ENCOUNTER — APPOINTMENT (OUTPATIENT)
Dept: CARDIOLOGY | Facility: MEDICAL CENTER | Age: 74
DRG: 322 | End: 2024-12-03
Attending: INTERNAL MEDICINE
Payer: MEDICARE

## 2024-12-03 PROCEDURE — 700102 HCHG RX REV CODE 250 W/ 637 OVERRIDE(OP): Performed by: INTERNAL MEDICINE

## 2024-12-03 PROCEDURE — 99232 SBSQ HOSP IP/OBS MODERATE 35: CPT | Performed by: INTERNAL MEDICINE

## 2024-12-03 PROCEDURE — 99233 SBSQ HOSP IP/OBS HIGH 50: CPT | Performed by: INTERNAL MEDICINE

## 2024-12-03 PROCEDURE — 700102 HCHG RX REV CODE 250 W/ 637 OVERRIDE(OP)

## 2024-12-03 PROCEDURE — A9270 NON-COVERED ITEM OR SERVICE: HCPCS

## 2024-12-03 PROCEDURE — A9270 NON-COVERED ITEM OR SERVICE: HCPCS | Performed by: INTERNAL MEDICINE

## 2024-12-03 PROCEDURE — 770020 HCHG ROOM/CARE - TELE (206)

## 2024-12-03 RX ORDER — CARVEDILOL 6.25 MG/1
6.25 TABLET ORAL 2 TIMES DAILY WITH MEALS
Status: DISCONTINUED | OUTPATIENT
Start: 2024-12-03 | End: 2024-12-05

## 2024-12-03 RX ADMIN — CARVEDILOL 3.12 MG: 6.25 TABLET, FILM COATED ORAL at 07:55

## 2024-12-03 RX ADMIN — ASPIRIN 81 MG: 81 TABLET, COATED ORAL at 05:31

## 2024-12-03 RX ADMIN — ATORVASTATIN CALCIUM 80 MG: 80 TABLET, FILM COATED ORAL at 20:44

## 2024-12-03 RX ADMIN — SPIRONOLACTONE 25 MG: 25 TABLET ORAL at 05:31

## 2024-12-03 RX ADMIN — DAPAGLIFLOZIN 10 MG: 10 TABLET, FILM COATED ORAL at 05:31

## 2024-12-03 RX ADMIN — SACUBITRIL AND VALSARTAN 1 TABLET: 24; 26 TABLET, FILM COATED ORAL at 05:31

## 2024-12-03 RX ADMIN — SACUBITRIL AND VALSARTAN 1 TABLET: 24; 26 TABLET, FILM COATED ORAL at 16:49

## 2024-12-03 RX ADMIN — OMEPRAZOLE 20 MG: 20 CAPSULE, DELAYED RELEASE ORAL at 05:31

## 2024-12-03 ASSESSMENT — COGNITIVE AND FUNCTIONAL STATUS - GENERAL
SUGGESTED CMS G CODE MODIFIER DAILY ACTIVITY: CH
SUGGESTED CMS G CODE MODIFIER MOBILITY: CH
MOBILITY SCORE: 24
DAILY ACTIVITIY SCORE: 24

## 2024-12-03 ASSESSMENT — ENCOUNTER SYMPTOMS
MYALGIAS: 0
BACK PAIN: 1
NAUSEA: 0
HEADACHES: 0
ABDOMINAL PAIN: 0
DEPRESSION: 0
FEVER: 0
SHORTNESS OF BREATH: 0
CHILLS: 0
VOMITING: 0

## 2024-12-03 ASSESSMENT — PAIN DESCRIPTION - PAIN TYPE: TYPE: ACUTE PAIN

## 2024-12-03 ASSESSMENT — FIBROSIS 4 INDEX: FIB4 SCORE: 1.01

## 2024-12-03 NOTE — CARE PLAN
The patient is Stable - Low risk of patient condition declining or worsening    Shift Goals  Clinical Goals: Monitor labs/vitals, Heart Cath today  Patient Goals: Heart cath today  Family Goals: When will heart cath be?    Progress made toward(s) clinical / shift goals:        Problem: Knowledge Deficit - Standard  Goal: Patient and family/care givers will demonstrate understanding of plan of care, disease process/condition, diagnostic tests and medications  Outcome: Progressing  Note: Education provided to pt on all medications and treatments provided, with pt verbalizing understanding. Pt oriented to unit and use of call bell when in need of assistance.      Problem: Pain Management  Goal: Pain level will decrease to patient's comfort goal  Outcome: Progressing  Note: Adult pain scale utilized to assess pain. Education provided on pharmacologic, and non-pharmacologic, pain management methods, with pt verbalizing understanding. Pt's response to pain medication provided monitored for effectiveness. No c/o CP or SOB at this time. Pt resting comfortably. Declines need for pain medication at this time.     Plans for pt to go to OR for heart catheterization today.

## 2024-12-03 NOTE — PROGRESS NOTES
.4 Eyes Skin Assessment Completed by Davina RN and FRAN Rich.    Head WDL  Ears WDL  Nose WDL  Mouth WDL  Neck WDL  Breast/Chest WDL  Shoulder Blades WDL  Spine WDL  (R) Arm/Elbow/Hand WDL  (L) Arm/Elbow/Hand WDL  Abdomen WDL  Groin WDL  Scrotum/Coccyx/Buttocks Redness and Blanching  (R) Leg Scar  (L) Leg WDL  (R) Heel/Foot/Toe WDL  (L) Heel/Foot/Toe WDL          Devices In Places Tele Box, Blood Pressure Cuff, and Pulse Ox      Interventions In Place Pillows    Possible Skin Injury No    Pictures Uploaded Into Epic Yes  Wound Consult Placed N/A  RN Wound Prevention Protocol Ordered No

## 2024-12-03 NOTE — PROGRESS NOTES
4 Eyes Skin Assessment Completed by Crissy RN and Angella DEE RN.    Head WDL  Ears WDL  Nose WDL  Mouth WDL  Neck WDL  Breast/Chest Scar - old scar to midline of chest from previous CABG  Shoulder Blades WDL  Spine WDL  (R) Arm/Elbow/Hand WDL  (L) Arm/Elbow/Hand WDL  Abdomen WDL  Groin WDL  Scrotum/Coccyx/Buttocks Redness, blanching  (R) Leg Scar and discoloration to mid shin from previous injury  (L) Leg WDL  (R) Heel/Foot/Toe WDL  (L) Heel/Foot/Toe WDL          Devices In Places Tele Box and Blood Pressure Cuff      Interventions In Place Pillows    Possible Skin Injury No    Pictures Uploaded Into Epic - yes, of right shin scar/discoloration  Wound Consult Placed N/A  RN Wound Prevention Protocol Ordered No     0 = understands/communicates without difficulty

## 2024-12-03 NOTE — PROGRESS NOTES
Patient's wife called the office. Wife states Dr Denton Joseph started patient on Anusol suppositories. Wife states they can not afford them. Wife is asking for something else. Patient uses [facility] in [place]. Wife is also asking about the referral to another specialist.  Has the appointment been made? Patient's call back ph# [phone]. [facility] Medicine Daily Progress Note    Date of Service  12/3/2024    Chief Complaint  Ramon Avery is a 74 y.o. male admitted 12/1/2024 with chest pain    Hospital Course  Ramon Avery is a 74 y.o. male who presented 12/1/2024 with chest pain. Is a 74 male who presents to the emergency department complaints of chest pain.  He hss a past medical history of CABG x 3 in 2016, paroxysmal atrial fibrillation, left bundle branch block, hypertension, hyperlipidemia, GERD.     He is seen in the ED at [facility] yesterday with complaints of chest pain and shortness of breath.  Workup was complete demonstrating no PE or ACS.  He was diagnosed with pneumonia and discharged with prescription for antibiotics which he has not started. Of note, patient quit taking his medications approximately 3 months ago only restarting day prior to admit. In the emergency department, initial troponin 20, repeat 19.  proBNP 787.  EKG demonstrates atrial fibrillation with known left bundle branch block.  Chest x-ray demonstrates mild cardiomegaly with no consolidations identified.     Patient had recurrent chest pain and cardiology consulted, plan for cardiac cath     Interval Problem Update  12/3 vitals stable on room air  A1c 6.2  LDL 98   Held eliquis  Telemetry reviewed patient in sinus rhythm with frequent PVC/PAC  Plan for cardiac cath today   Patient denies further chest pain or shortness of breath.  Patient reports he stopped taking his medications as they were too expensive as between Entresto Farxiga and Eliquis it was more than the Social Security amount that he gets each month.  Discussed with cardiology, they will reach out to the prescription coordinators to help get his medications covered moving forward.  Will plan for meds to bed on discharge.     I have discussed this patient's plan of care and discharge plan at IDT rounds today with Case Management, Nursing, Nursing leadership, and other members of the IDT  team.    Consultants/Specialty  cardiology    Code Status  Full Code    Disposition  The patient is not medically cleared for discharge to home or a post-acute facility.  Anticipate discharge to: home with close outpatient follow-up    I have placed the appropriate orders for post-discharge needs.    Review of Systems  Review of Systems   Constitutional:  Negative for chills and fever.   Respiratory:  Negative for shortness of breath.    Cardiovascular:  Negative for chest pain and leg swelling.   Gastrointestinal:  Negative for abdominal pain, nausea and vomiting.   Musculoskeletal:  Positive for back pain. Negative for myalgias.   Skin:  Negative for rash.   Neurological:  Negative for headaches.   Psychiatric/Behavioral:  Negative for depression.    All other systems reviewed and are negative.       Physical Exam  Temp:  [36.2 °C (97.2 °F)-37 °C (98.6 °F)] 36.5 °C (97.7 °F)  Pulse:  [61-76] 63  Resp:  [18-20] 18  BP: (106-144)/(57-66) 112/60  SpO2:  [92 %-97 %] 94 %    Physical Exam  Vitals and nursing note reviewed.   Constitutional:       General: He is not in acute distress.     Appearance: He is not ill-appearing.      Comments: Sitting up in a chair at bedside   HENT:      Head: Normocephalic and atraumatic.      Mouth/Throat:      Mouth: Mucous membranes are moist.   Cardiovascular:      Rate and Rhythm: Normal rate. Rhythm irregular.   Pulmonary:      Effort: Pulmonary effort is normal. No respiratory distress.      Breath sounds: Normal breath sounds. No wheezing or rales.   Abdominal:      General: There is distension.      Palpations: Abdomen is soft.      Tenderness: There is no abdominal tenderness.   Musculoskeletal:      Right lower leg: No edema.      Left lower leg: No edema.   Neurological:      General: No focal deficit present.      Mental Status: He is alert and oriented to person, place, and time.      Motor: No weakness.   Psychiatric:         Mood and Affect: Mood normal.         Behavior:  Behavior normal.         Thought Content: Thought content normal.         Fluids    Intake/Output Summary (Last 24 hours) at 12/3/2024 1410  Last data filed at 12/3/2024 0800  Gross per 24 hour   Intake 640 ml   Output 0 ml   Net 640 ml        Laboratory  Recent Labs     12/01/24  1434 12/02/24  0205   WBC 11.8* 10.0   RBC 4.44* 4.16*   HEMOGLOBIN 14.6 13.4*   HEMATOCRIT 43.8 41.1*   MCV 98.6* 98.8*   MCH 32.9 32.2   MCHC 33.3 32.6   RDW 49.0 48.6   PLATELETCT 235 221   MPV 9.7 10.1     Recent Labs     12/01/24  1434 12/02/24  0205   SODIUM 136 139   POTASSIUM 4.0 4.2   CHLORIDE 102 103   CO2 22 26   GLUCOSE 107* 102*   BUN 17 22   CREATININE 0.85 0.91   CALCIUM 9.2 9.0             Recent Labs     12/02/24  0205   TRIGLYCERIDE 56   HDL 50   LDL 98       Imaging  EC-ECHOCARDIOGRAM COMPLETE W/ CONT   Final Result      DX-CHEST-PORTABLE (1 VIEW)   Final Result         1.  Mild cardiomegaly.      2.  No consolidations identified.      CL-LEFT HEART CATHETERIZATION WITH POSSIBLE INTERVENTION    (Results Pending)    EKG reviewed by nsr, twave inversion in v5 and v6    Assessment/Plan  * Chest pain- (present on admission)  Assessment & Plan  The ASCVD Risk score (Tuan GIL, et al., 2019) failed to calculate for the following reasons:    Risk score cannot be calculated because patient has a medical history suggesting prior/existing ASCVD  Initial troponin negative, continue to trend  History of CABG, left bundle branch block and paroxysmal A-fib  Monitor on telemetry    Aspirin beta-blocker, statin  Cardiology consulted plan for cardiac cath today       HFrEF (heart failure with reduced ejection fraction) (HCC)- (present on admission)  Assessment & Plan  History of  Last echocardiogram demonstrates EF of approximately 35%    Repeat echo EF of 39%.   wall motion abnormality  Cardiology consulted, restart Coreg, Farxiga, Entresto, Aldactone  Will need meds to bed on discharge  Cardiology will reach out to prescription  coordinators to help get his medications covered outpatient    Paroxysmal atrial fibrillation - postoperative from CABG and then on event monitor 3/2019- (present on admission)  Assessment & Plan  Monitor on telemetry  Continue home medication    S/P CABG x 3 5/2016 Dr Ryder for 3 vessel disease including left main aneurysm- (present on admission)  Assessment & Plan  History of  Continue home medication      Dyslipidemia- (present on admission)  Assessment & Plan  Continue home medication      Essential hypertension- (present on admission)  Assessment & Plan  Continue home medication  Monitor  As needed labetalol for SBP greater than 180       Total time spent in chart review, at bedside with the patient, discussing with consultants, nursing and case management: 53 minutes    VTE prophylaxis: SCDs/TEDs, held eliquis for procedure     I have performed a physical exam and reviewed and updated ROS and Plan today (12/3/2024). In review of yesterday's note (12/2/2024), there are no changes except as documented above.

## 2024-12-03 NOTE — PROGRESS NOTES
CARDIOLOGY CLINIC NOTE    Date: 12/3/2024  Consulting Provider: Marysol Durham D.o.    Patient Name: Ramon Avery  YOB: 1950  MRN: [id number]    Assessment:   # Chest pain, possibly ischemic  # Coronary artery disease, 3V CABG (LIMA-LAD, SVG-OM, SVG-RCA)  # Ischemic cardiomyopathy  # Chronic LBBB  # Paroxysmal atrial fibrillation on chronic anticoagulation  # Hyperlipidemia, not controlled  # Hypertension  # History of CVA    Although the patient's troponin did not increase significantly, his symptoms are potentially ischemic and he has not had an ischemic evaluation since bypass surgery in 2016.  Additionally, he is a good candidate for upgrade to CRT given his known history of cardiomyopathy with an underlying LBBB despite medical therapy and he would require a repeat ischemic evaluation prior to considering CRT.    Recommendations:   -Plan for cardiac catheterization today  -Continue atorvastatin 80 mg nightly  -Continue aspirin 81 mg daily  -Increase carvedilol to 6.25 mg twice daily  -Continue Entresto 50 mg twice daily  -Continue empagliflozin 10 mg daily  -Continue spironolactone 25 mg daily  -Pending results of cardiac catheterization would plan for outpatient evaluation for CRT    Disposition:   Cardiology will continue to follow.  Further recommendations will pending at time of cardiac catheterization.    History of Present Illness:   Ramon Avery is a 74 year-old gentleman coronary artery disease with prior three-vessel CABG, ischemic cardiomyopathy with LVEF of 35%, chronic left bundle branch block, and paroxysmal atrial fibrillation.  Briefly, he presented 3 days ago to [facility] with right-sided neck pain that radiated into his chest and occurred after working on his tractor.  He had an unremarkable evaluation initially.  However, yesterday, he developed left-sided neck pain that radiated into his left arm, therefore, he was referred to [facility] for  further evaluation.  His initial evaluation showed unremarkable troponins and an ECG with a chronic LBBB, but no obvious ischemic changes.  Cardiology was consulted for further recommendations.    Currently, he reports being chest pain-free.  However, he notes that his previous neck pain was highly concerning to him mainly because of the severity, which started at 3/10, but increased to 8/10.  He was also concerned because these pains radiated into his chest and left arm.    Subjective/Events:   Denies neck/chest pain this morning.    Medications and Allergies:     Current Facility-Administered Medications   Medication Dose Route Frequency Provider Last Rate Last Admin    nitroglycerin (Nitrostat) tablet 0.4 mg  0.4 mg Sublingual Q5 MIN PRN Abdullahi Allred M.D.   0.4 mg at 12/02/24 0811    atorvastatin (Lipitor) tablet 80 mg  80 mg Oral Nightly Young Olson M.D.   80 mg at 12/02/24 2117    dapagliflozin propanediol (Farxiga) tablet 10 mg  10 mg Oral DAILY Young Olson M.D.   10 mg at 12/03/24 0531    spironolactone (Aldactone) tablet 25 mg  25 mg Oral Q DAY Young Olson M.D.   25 mg at 12/03/24 0531    carvedilol (Coreg) tablet 3.125 mg  3.125 mg Oral BID WITH MEALS DEA VazquezP.R.N.   3.125 mg at 12/03/24 0755    sacubitril-valsartan (Entresto) 24-26 MG 1 Tablet  1 Tablet Oral BID BETTYE Vazquez.P.R.N.   1 Tablet at 12/03/24 0531    regadenoson (Lexiscan) injection SOLN 0.4 mg  0.4 mg Intravenous Once PRN BETTYE Vazquez.P.R.N.        aminophylline injection 100 mg  100 mg Intravenous Q5 MIN PRN BETTYE Vazquez.P.R.N.        acetaminophen (Tylenol) tablet 650 mg  650 mg Oral Q6HRS PRN DEA VazquezP.R.N.        aspirin EC tablet 81 mg  81 mg Oral DAILY BETTYE Vazquez.P.R.N.   81 mg at 12/03/24 0531    labetalol (Normodyne/Trandate) injection 10 mg  10 mg Intravenous Q4HRS PRN AUSTIN Vazquez        ondansetron (Zofran) syringe/vial  injection 4 mg  4 mg Intravenous Q4HRS PRN DEA VazquezP.RBRYAN        ondansetron (Zofran ODT) dispertab 4 mg  4 mg Oral Q4HRS PRN DEA VazquezP.R.TISHA        omeprazole (PriLOSEC) capsule 20 mg  20 mg Oral DAILY DEA VazquezP.R.NPancho   20 mg at 12/03/24 0531    mag hydrox-al hydrox-simeth (Maalox Plus Es Or Mylanta Ds) suspension 10 mL  10 mL Oral 4X/DAY PRN DEA VazquezP.R.TISHA        cyclobenzaprine (Flexeril) tablet 10 mg  10 mg Oral TID PRN DEA VazquezP.R.ELKIN.         Allergies   Allergen Reactions    Ocate Swelling     Lip blistering and swelling     Vital Signs:   BP (!) 144/57   Pulse 76   Temp 36.2 °C (97.2 °F) (Temporal)   Resp 20   Ht 1.829 m (6')   Wt 120 kg (264 lb 5.3 oz)   SpO2 94%   BMI 35.85 kg/m²   Vitals:    12/02/24 2032 12/02/24 2354 12/03/24 0440 12/03/24 0753   BP: 106/60 121/59 119/64 (!) 144/57   Pulse: 66 73 68 76   Resp: 18 18 18 20   Temp: 36.6 °C (97.9 °F) 37 °C (98.6 °F) 36.7 °C (98.1 °F) 36.2 °C (97.2 °F)   TempSrc: Temporal Temporal Temporal Temporal   SpO2: 93% 93% 93% 94%   Weight:   120 kg (264 lb 5.3 oz)    Height:         Body mass index is 35.85 kg/m².  Oxygen Therapy:  Pulse Oximetry: 94 %, O2 (LPM): 0, O2 Delivery Device: None - Room Air    Physical Examination:   General: Well-appearing, no acute distress  Eyes: Extraocular movements intact, anicteric  HENT: Neck full range of motion, no jugular venous distension  Pulmonary: Normal respiratory effort, no distress  Cardiovascular: Regular rate, regular rhythm  Gastrointestinal: Obese, non-distended  Extremities: Warm and well perfused, no significant lower extremity edema  Neurological: Alert and oriented, no gross focal motor deficits  Psychiatric: Appropriate affect, normal judgment    Laboratories:   Estimated Creatinine Clearance: 98.2 mL/min (by C-G formula based on SCr of 0.91 mg/dL).  Recent Labs     12/01/24  1434 12/02/24  0205 12/02/24  0752   CREATININE 0.85 0.91   --    BUN 17 22  --    POTASSIUM 4.0 4.2  --    SODIUM 136 139  --    CALCIUM 9.2 9.0  --    MAGNESIUM  --   --  2.2   CO2 22 26  --    ALBUMIN 4.4  --   --      Recent Labs     12/01/24  1434 12/02/24  0205   GLUCOSE 107* 102*     Recent Labs     12/01/24  1434   ASTSGOT 12   ALTSGPT 15   ALKPHOSPHAT 62     Recent Labs     12/01/24  1434 12/02/24  0205   WBC 11.8* 10.0   HEMOGLOBIN 14.6 13.4*   PLATELETCT 235 221     Recent Labs     12/01/24  1434 12/01/24  1725 12/01/24  2156 12/02/24  0205   TROPONINT 20* 19 18  --    NTPROBNP 787*  --   --   --    HBA1C  --   --   --  6.2*     Lab Results   Component Value Date/Time    LDL 98 12/02/2024 0205     Lab Results   Component Value Date/Time    HDL 50 12/02/2024 0205       Lab Results   Component Value Date/Time    TRIGLYCERIDE 56 12/02/2024 0205       Lab Results   Component Value Date/Time    CHOLSTRLTOT 159 12/02/2024 0205     Studies:   Echocardiography  I personally reviewed and interpreted the patient's echocardiogram, which demonstrates moderately reduced left ventricular systolic function with an estimated LVEF of 35-40%, significant septal dyssynchrony consistent with LBBB, no significant valvular abnormalities, and a mild to moderate ascending aortic aneurysm.    X-ray/CT/MRI  I personally reviewed and interpreted the patient's chest x-ray, which demonstrates no acute cardiopulmonary disease.    Electrophysiology  I personally reviewed and interpret the patient's ECG, which demonstrates sinus rhythm with first-degree AV block and a chronic LBBB          Young Olson MD, [facility]  Interventional Cardiology  [facility], [address]  [address] [address]  Phone: [phone]  Fax: [phone]

## 2024-12-03 NOTE — CARE PLAN
The patient is Watcher - Medium risk of patient condition declining or worsening    Shift Goals  Clinical Goals: monior Labs, VS, NPO @midnight  Patient Goals: sleep  Family Goals: JAQUAN    Progress made toward(s) clinical / shift goals:        Problem: Medication  Goal: Compliance with prescribed medication will improve  Outcome: Progressing  Note: Patient understand importance behind medication compliance.      Problem: Pain - Standard  Goal: Alleviation of pain or a reduction in pain to the patient’s comfort goal  Description: Target End Date:  Prior to discharge or change in level of care    Document on Vitals flowsheet    1.  Document pain using the appropriate pain scale per order or unit policy  2.  Educate and implement non-pharmacologic comfort measures (i.e. relaxation, distraction, massage, cold/heat therapy, etc.)  3.  Pain management medications as ordered  4.  Reassess pain after pain med administration per policy  5.  If opiods administered assess patient's response to pain medication is appropriate per POSS sedation scale  6.  Follow pain management plan developed in collaboration with patient and interdisciplinary team (including palliative care or pain specialists if applicable)  Outcome: Progressing  Note: Patient did not have any complaints of pain during this shift

## 2024-12-03 NOTE — PROGRESS NOTES
Pt arrived to [facility] via wheelchair, transported by RN from CDU, w/o complication. Vitals obtained, WNL. No c/o CP at present. SOB noted only w/exertion at this time. Personal belongings at bedside. Call bell left within reach. Plan of care is ongoing at this time.

## 2024-12-04 ENCOUNTER — APPOINTMENT (OUTPATIENT)
Dept: CARDIOLOGY | Facility: MEDICAL CENTER | Age: 74
DRG: 322 | End: 2024-12-04
Attending: INTERNAL MEDICINE
Payer: MEDICARE

## 2024-12-04 LAB
ACT BLD: 464 SEC (ref 74–137)
ALBUMIN SERPL BCP-MCNC: 3.8 G/DL (ref 3.2–4.9)
BUN SERPL-MCNC: 24 MG/DL (ref 8–22)
CALCIUM ALBUM COR SERPL-MCNC: 9.3 MG/DL (ref 8.5–10.5)
CALCIUM SERPL-MCNC: 9.1 MG/DL (ref 8.5–10.5)
CHLORIDE SERPL-SCNC: 104 MMOL/L (ref 96–112)
CO2 SERPL-SCNC: 22 MMOL/L (ref 20–33)
CREAT SERPL-MCNC: 0.77 MG/DL (ref 0.5–1.4)
EKG IMPRESSION: NORMAL
ERYTHROCYTE [DISTWIDTH] IN BLOOD BY AUTOMATED COUNT: 46.6 FL (ref 35.9–50)
GFR SERPLBLD CREATININE-BSD FMLA CKD-EPI: 95 ML/MIN/1.73 M 2
GLUCOSE SERPL-MCNC: 91 MG/DL (ref 65–99)
HCT VFR BLD AUTO: 41.5 % (ref 42–52)
HGB BLD-MCNC: 14 G/DL (ref 14–18)
MCH RBC QN AUTO: 32.7 PG (ref 27–33)
MCHC RBC AUTO-ENTMCNC: 33.7 G/DL (ref 32.3–36.5)
MCV RBC AUTO: 97 FL (ref 81.4–97.8)
PHOSPHATE SERPL-MCNC: 3.1 MG/DL (ref 2.5–4.5)
PLATELET # BLD AUTO: 240 K/UL (ref 164–446)
PMV BLD AUTO: 10.1 FL (ref 9–12.9)
POTASSIUM SERPL-SCNC: 4.4 MMOL/L (ref 3.6–5.5)
RBC # BLD AUTO: 4.28 M/UL (ref 4.7–6.1)
SODIUM SERPL-SCNC: 139 MMOL/L (ref 135–145)
WBC # BLD AUTO: 7.8 K/UL (ref 4.8–10.8)

## 2024-12-04 PROCEDURE — 93010 ELECTROCARDIOGRAM REPORT: CPT | Mod: 59 | Performed by: INTERNAL MEDICINE

## 2024-12-04 PROCEDURE — 99232 SBSQ HOSP IP/OBS MODERATE 35: CPT | Performed by: INTERNAL MEDICINE

## 2024-12-04 PROCEDURE — A9270 NON-COVERED ITEM OR SERVICE: HCPCS

## 2024-12-04 PROCEDURE — 770020 HCHG ROOM/CARE - TELE (206)

## 2024-12-04 PROCEDURE — 85347 COAGULATION TIME ACTIVATED: CPT

## 2024-12-04 PROCEDURE — A9270 NON-COVERED ITEM OR SERVICE: HCPCS | Performed by: INTERNAL MEDICINE

## 2024-12-04 PROCEDURE — 4A023N7 MEASUREMENT OF CARDIAC SAMPLING AND PRESSURE, LEFT HEART, PERCUTANEOUS APPROACH: ICD-10-PCS | Performed by: INTERNAL MEDICINE

## 2024-12-04 PROCEDURE — 92928 PRQ TCAT PLMT NTRAC ST 1 LES: CPT | Mod: RC | Performed by: INTERNAL MEDICINE

## 2024-12-04 PROCEDURE — 700102 HCHG RX REV CODE 250 W/ 637 OVERRIDE(OP)

## 2024-12-04 PROCEDURE — B2111ZZ FLUOROSCOPY OF MULTIPLE CORONARY ARTERIES USING LOW OSMOLAR CONTRAST: ICD-10-PCS | Performed by: INTERNAL MEDICINE

## 2024-12-04 PROCEDURE — 93459 L HRT ART/GRFT ANGIO: CPT | Mod: 26,XU | Performed by: INTERNAL MEDICINE

## 2024-12-04 PROCEDURE — 700101 HCHG RX REV CODE 250

## 2024-12-04 PROCEDURE — 700117 HCHG RX CONTRAST REV CODE 255: Performed by: INTERNAL MEDICINE

## 2024-12-04 PROCEDURE — 027034Z DILATION OF CORONARY ARTERY, ONE ARTERY WITH DRUG-ELUTING INTRALUMINAL DEVICE, PERCUTANEOUS APPROACH: ICD-10-PCS | Performed by: INTERNAL MEDICINE

## 2024-12-04 PROCEDURE — 700105 HCHG RX REV CODE 258: Performed by: INTERNAL MEDICINE

## 2024-12-04 PROCEDURE — 700102 HCHG RX REV CODE 250 W/ 637 OVERRIDE(OP): Performed by: INTERNAL MEDICINE

## 2024-12-04 PROCEDURE — 36415 COLL VENOUS BLD VENIPUNCTURE: CPT

## 2024-12-04 PROCEDURE — 93005 ELECTROCARDIOGRAM TRACING: CPT | Mod: TC | Performed by: INTERNAL MEDICINE

## 2024-12-04 PROCEDURE — 700111 HCHG RX REV CODE 636 W/ 250 OVERRIDE (IP)

## 2024-12-04 PROCEDURE — 99152 MOD SED SAME PHYS/QHP 5/>YRS: CPT | Performed by: INTERNAL MEDICINE

## 2024-12-04 PROCEDURE — 80069 RENAL FUNCTION PANEL: CPT

## 2024-12-04 PROCEDURE — 99233 SBSQ HOSP IP/OBS HIGH 50: CPT | Performed by: INTERNAL MEDICINE

## 2024-12-04 PROCEDURE — 99153 MOD SED SAME PHYS/QHP EA: CPT

## 2024-12-04 PROCEDURE — 85027 COMPLETE CBC AUTOMATED: CPT

## 2024-12-04 RX ORDER — HEPARIN SODIUM 1000 [USP'U]/ML
INJECTION, SOLUTION INTRAVENOUS; SUBCUTANEOUS
Status: COMPLETED
Start: 2024-12-04 | End: 2024-12-04

## 2024-12-04 RX ORDER — SODIUM CHLORIDE 9 MG/ML
3 INJECTION, SOLUTION INTRAVENOUS CONTINUOUS
Status: ACTIVE | OUTPATIENT
Start: 2024-12-04 | End: 2024-12-04

## 2024-12-04 RX ORDER — LIDOCAINE HYDROCHLORIDE 20 MG/ML
INJECTION, SOLUTION INFILTRATION; PERINEURAL
Status: COMPLETED
Start: 2024-12-04 | End: 2024-12-04

## 2024-12-04 RX ORDER — HEPARIN SODIUM 200 [USP'U]/100ML
INJECTION, SOLUTION INTRAVENOUS
Status: COMPLETED
Start: 2024-12-04 | End: 2024-12-04

## 2024-12-04 RX ORDER — CLOPIDOGREL BISULFATE 75 MG/1
75 TABLET ORAL DAILY
Status: DISCONTINUED | OUTPATIENT
Start: 2024-12-05 | End: 2024-12-05 | Stop reason: HOSPADM

## 2024-12-04 RX ORDER — MIDAZOLAM HYDROCHLORIDE 1 MG/ML
INJECTION INTRAMUSCULAR; INTRAVENOUS
Status: COMPLETED
Start: 2024-12-04 | End: 2024-12-04

## 2024-12-04 RX ORDER — VERAPAMIL HYDROCHLORIDE 2.5 MG/ML
INJECTION, SOLUTION INTRAVENOUS
Status: COMPLETED
Start: 2024-12-04 | End: 2024-12-04

## 2024-12-04 RX ORDER — PHENYLEPHRINE HCL IN 0.9% NACL 1 MG/10 ML
SYRINGE (ML) INTRAVENOUS
Status: COMPLETED
Start: 2024-12-04 | End: 2024-12-04

## 2024-12-04 RX ORDER — CLOPIDOGREL 300 MG/1
600 TABLET, FILM COATED ORAL ONCE
Status: DISCONTINUED | OUTPATIENT
Start: 2024-12-04 | End: 2024-12-04

## 2024-12-04 RX ORDER — CLOPIDOGREL 300 MG/1
TABLET, FILM COATED ORAL
Status: COMPLETED
Start: 2024-12-04 | End: 2024-12-04

## 2024-12-04 RX ADMIN — NITROGLYCERIN 10 ML: 20 INJECTION INTRAVENOUS at 14:17

## 2024-12-04 RX ADMIN — SPIRONOLACTONE 25 MG: 25 TABLET ORAL at 04:41

## 2024-12-04 RX ADMIN — ATORVASTATIN CALCIUM 80 MG: 80 TABLET, FILM COATED ORAL at 22:17

## 2024-12-04 RX ADMIN — SACUBITRIL AND VALSARTAN 1 TABLET: 24; 26 TABLET, FILM COATED ORAL at 04:41

## 2024-12-04 RX ADMIN — FENTANYL CITRATE 100 MCG: 50 INJECTION, SOLUTION INTRAMUSCULAR; INTRAVENOUS at 14:56

## 2024-12-04 RX ADMIN — CLOPIDOGREL BISULFATE 600 MG: 300 TABLET, FILM COATED ORAL at 15:04

## 2024-12-04 RX ADMIN — MIDAZOLAM HYDROCHLORIDE 2 MG: 1 INJECTION, SOLUTION INTRAMUSCULAR; INTRAVENOUS at 14:32

## 2024-12-04 RX ADMIN — SODIUM CHLORIDE 3 ML/KG/HR: 9 INJECTION, SOLUTION INTRAVENOUS at 17:00

## 2024-12-04 RX ADMIN — DAPAGLIFLOZIN 10 MG: 10 TABLET, FILM COATED ORAL at 04:41

## 2024-12-04 RX ADMIN — OMEPRAZOLE 20 MG: 20 CAPSULE, DELAYED RELEASE ORAL at 04:41

## 2024-12-04 RX ADMIN — LIDOCAINE HYDROCHLORIDE: 20 INJECTION, SOLUTION INFILTRATION; PERINEURAL at 14:17

## 2024-12-04 RX ADMIN — ASPIRIN 81 MG: 81 TABLET, COATED ORAL at 04:41

## 2024-12-04 RX ADMIN — SACUBITRIL AND VALSARTAN 1 TABLET: 24; 26 TABLET, FILM COATED ORAL at 18:47

## 2024-12-04 RX ADMIN — HEPARIN SODIUM 2000 UNITS: 200 INJECTION, SOLUTION INTRAVENOUS at 14:11

## 2024-12-04 RX ADMIN — VERAPAMIL HYDROCHLORIDE 2.5 MG: 2.5 INJECTION, SOLUTION INTRAVENOUS at 14:17

## 2024-12-04 RX ADMIN — HEPARIN SODIUM: 1000 INJECTION, SOLUTION INTRAVENOUS; SUBCUTANEOUS at 14:17

## 2024-12-04 RX ADMIN — IOHEXOL 70 ML: 350 INJECTION, SOLUTION INTRAVENOUS at 15:02

## 2024-12-04 ASSESSMENT — ENCOUNTER SYMPTOMS
MYALGIAS: 0
HEADACHES: 0
SHORTNESS OF BREATH: 0
DEPRESSION: 0
ABDOMINAL PAIN: 0
NAUSEA: 0
CHILLS: 0
FEVER: 0

## 2024-12-04 ASSESSMENT — FIBROSIS 4 INDEX: FIB4 SCORE: .929516003089780053

## 2024-12-04 ASSESSMENT — PAIN DESCRIPTION - PAIN TYPE
TYPE: ACUTE PAIN
TYPE: ACUTE PAIN

## 2024-12-04 NOTE — CARE PLAN
The patient is Stable - Low risk of patient condition declining or worsening    Shift Goals  Clinical Goals: NPO @ midnight, Monior lab, Monitor VS  Patient Goals: sleep  Family Goals: JAQUAN    Progress made toward(s) clinical / shift goals:        Problem: Medication  Goal: Compliance with prescribed medication will improve  Outcome: Progressing  Note: Was medication compliant this shift.      Problem: Knowledge Deficit  Goal: Knowledge of the prescribed therapeutic regimen will improve  Outcome: Progressing  Note: Asked question regarding nitroglycerine.

## 2024-12-04 NOTE — PROGRESS NOTES
4 Eyes Skin Assessment Completed by Marysol NJ, RN and Karlee RIZZO RN.    Head WDL  Ears WDL  Nose WDL  Mouth WDL  Neck WDL  Breast/Chest WDL  Shoulder Blades WDL  Spine WDL  (R) Arm/Elbow/Hand WDL  (L) Arm/Elbow/Hand WDL  Abdomen WDL  Groin WDL  Scrotum/Coccyx/Buttocks Blanching  (R) Leg WDL-Discoloration   (L) Leg WDL  (R) Heel/Foot/Toe WDL  (L) Heel/Foot/Toe WDL          Devices In Places Tele Box      Interventions In Place Pillows    Possible Skin Injury No    Pictures Uploaded Into Epic N/A  Wound Consult Placed N/A  RN Wound Prevention Protocol Ordered No

## 2024-12-04 NOTE — PROGRESS NOTES
..4 Eyes Skin Assessment Completed by Davina RN and Guillermo RN.    Head WDL  Ears Redness and Blanching  Nose WDL  Mouth WDL  Neck Redness and Blanching  Breast/Chest Redness and Blanching  Shoulder Blades WDL  Spine WDL  (R) Arm/Elbow/Hand Bruising and Scab  (L) Arm/Elbow/Hand Scab  Abdomen Redness and Blanching redness from electrodes   Groin WDL  Scrotum/Coccyx/Buttocks Redness and Blanching  (R) Leg Scar  (L) Leg WDL  (R) Heel/Foot/Toe WDL  (L) Heel/Foot/Toe WDL          Devices In Places Tele Box, Blood Pressure Cuff, and Pulse Ox      Interventions In Place Pillows and Barrier Cream    Possible Skin Injury No    Pictures Uploaded Into Epic Yes  Wound Consult Placed N/A  RN Wound Prevention Protocol Ordered No

## 2024-12-04 NOTE — PROGRESS NOTES
[facility] Medicine Daily Progress Note    Date of Service  12/4/2024    Chief Complaint  Ramon Avery is a 74 y.o. male admitted 12/1/2024 with chest pain    Hospital Course  Ramon Avery is a 74 y.o. male who presented 12/1/2024 with chest pain. Is a 74 male who presents to the emergency department complaints of chest pain.  He hss a past medical history of CABG x 3 in 2016, paroxysmal atrial fibrillation, left bundle branch block, hypertension, hyperlipidemia, GERD.     He is seen in the ED at [place] yesterday with complaints of chest pain and shortness of breath.  Workup was complete demonstrating no PE or ACS.  He was diagnosed with pneumonia and discharged with prescription for antibiotics which he has not started. Of note, patient quit taking his medications approximately 3 months ago only restarting day prior to admit. In the emergency department, initial troponin 20, repeat 19.  proBNP 787.  EKG demonstrates atrial fibrillation with known left bundle branch block.  Chest x-ray demonstrates mild cardiomegaly with no consolidations identified.     Patient had recurrent chest pain and cardiology consulted, plan for cardiac cath     Interval Problem Update  12/3 vitals stable on room air  A1c 6.2  LDL 98   Held eliquis  Telemetry reviewed patient in sinus rhythm with frequent PVC/PAC  Plan for cardiac cath today   Patient denies further chest pain or shortness of breath.  Patient reports he stopped taking his medications as they were too expensive as between Entresto Farxiga and Eliquis it was more than the Social Security amount that he gets each month.  Discussed with cardiology, they will reach out to the prescription coordinators to help get his medications covered moving forward.  Will plan for meds to bed on discharge.     12/4 No acute events overnight   Renal function and electrolytes stable  Echo EF 39%, grade III diastolic dysfunction   Cath rescheduled for this afternoon  Tele note  yesterday evening noted afib   Notified by nursing that patient having multiple runs of nonsustained VT, longest 29 beats.  Cardiology notified.  Patient denies any recurrent chest pain or dyspnea.    I have discussed this patient's plan of care and discharge plan at IDT rounds today with Case Management, Nursing, Nursing leadership, and other members of the IDT team.    Consultants/Specialty  cardiology    Code Status  Full Code    Disposition  The patient is not medically cleared for discharge to home or a post-acute facility.  Anticipate discharge to: home with close outpatient follow-up    I have placed the appropriate orders for post-discharge needs.    Review of Systems  Review of Systems   Constitutional:  Negative for chills and fever.   Respiratory:  Negative for shortness of breath.    Cardiovascular:  Negative for chest pain and leg swelling.   Gastrointestinal:  Negative for abdominal pain and nausea.   Musculoskeletal:  Negative for myalgias.   Skin:  Negative for rash.   Neurological:  Negative for headaches.   Psychiatric/Behavioral:  Negative for depression.    All other systems reviewed and are negative.       Physical Exam  Temp:  [36.5 °C (97.7 °F)-36.8 °C (98.2 °F)] 36.6 °C (97.9 °F)  Pulse:  [51-61] 60  Resp:  [16-18] 16  BP: ()/(54-63) 125/62  SpO2:  [90 %-95 %] 94 %    Physical Exam  Vitals and nursing note reviewed.   Constitutional:       General: He is not in acute distress.     Appearance: He is obese.      Comments: Sitting up in a chair at bedside   HENT:      Head: Normocephalic and atraumatic.      Mouth/Throat:      Mouth: Mucous membranes are moist.   Cardiovascular:      Rate and Rhythm: Normal rate. Rhythm irregular.   Pulmonary:      Effort: Pulmonary effort is normal. No respiratory distress.      Breath sounds: Normal breath sounds. No wheezing.   Abdominal:      Palpations: Abdomen is soft.      Tenderness: There is no abdominal tenderness.   Musculoskeletal:      Right  lower leg: No edema.      Left lower leg: No edema.   Neurological:      General: No focal deficit present.      Mental Status: He is alert and oriented to person, place, and time.      Motor: No weakness.   Psychiatric:         Mood and Affect: Mood normal.         Behavior: Behavior normal.         Thought Content: Thought content normal.         Fluids    Intake/Output Summary (Last 24 hours) at 12/4/2024 1546  Last data filed at 12/4/2024 1200  Gross per 24 hour   Intake 490 ml   Output 0 ml   Net 490 ml        Laboratory  Recent Labs     12/02/24  0205 12/04/24  0137   WBC 10.0 7.8   RBC 4.16* 4.28*   HEMOGLOBIN 13.4* 14.0   HEMATOCRIT 41.1* 41.5*   MCV 98.8* 97.0   MCH 32.2 32.7   MCHC 32.6 33.7   RDW 48.6 46.6   PLATELETCT 221 240   MPV 10.1 10.1     Recent Labs     12/02/24  0205 12/04/24  0137   SODIUM 139 139   POTASSIUM 4.2 4.4   CHLORIDE 103 104   CO2 26 22   GLUCOSE 102* 91   BUN 22 24*   CREATININE 0.91 0.77   CALCIUM 9.0 9.1             Recent Labs     12/02/24  0205   TRIGLYCERIDE 56   HDL 50   LDL 98       Imaging  EC-ECHOCARDIOGRAM COMPLETE W/ CONT   Final Result      DX-CHEST-PORTABLE (1 VIEW)   Final Result         1.  Mild cardiomegaly.      2.  No consolidations identified.      CL-LEFT HEART CATHETERIZATION WITH POSSIBLE INTERVENTION    (Results Pending)    EKG reviewed by nsr, twave inversion in v5 and v6    Assessment/Plan  * Chest pain- (present on admission)  Assessment & Plan  The ASCVD Risk score (Tuan GIL, et al., 2019) failed to calculate for the following reasons:    Risk score cannot be calculated because patient has a medical history suggesting prior/existing ASCVD  Initial troponin negative, continue to trend  History of CABG, left bundle branch block and paroxysmal A-fib  Monitor on telemetry    Aspirin beta-blocker, statin  Cardiology consulted plan for cardiac cath today       HFrEF (heart failure with reduced ejection fraction) (HCC)- (present on admission)  Assessment &  Plan  History of  Last echocardiogram demonstrates EF of approximately 35%    Repeat echo EF of 39%.   wall motion abnormality  Cardiology consulted, restart Coreg, Farxiga, Entresto, Aldactone  Will need meds to bed on discharge  Cardiology will reach out to prescription coordinators to help get his medications covered outpatient    Paroxysmal atrial fibrillation - postoperative from CABG and then on event monitor 3/2019- (present on admission)  Assessment & Plan  Monitor on telemetry  Continue home medication    S/P CABG x 3 5/2016 Dr Ryder for 3 vessel disease including left main aneurysm- (present on admission)  Assessment & Plan  History of  Continue home medication      Dyslipidemia- (present on admission)  Assessment & Plan  Continue home medication      Essential hypertension- (present on admission)  Assessment & Plan  Continue home medication  Monitor  As needed labetalol for SBP greater than 180       Total time spent in chart review, at bedside with the patient, discussing with consultants, nursing and case management: 51 minutes    VTE prophylaxis: SCDs/TEDs, held eliquis for procedure     I have performed a physical exam and reviewed and updated ROS and Plan today (12/4/2024). In review of yesterday's note (12/3/2024), there are no changes except as documented above.

## 2024-12-04 NOTE — PROGRESS NOTES
CARDIOLOGY CLINIC NOTE    Date: 12/4/2024  Consulting Provider: Marysol Durham D.o.    Patient Name: Ramon Avery  YOB: 1950  MRN: [id number]    Assessment:   # Chest pain, possibly ischemic  # Coronary artery disease, 3V CABG (LIMA-LAD, SVG-OM, SVG-RCA)  # Ischemic cardiomyopathy  # Chronic LBBB  # Paroxysmal atrial fibrillation on chronic anticoagulation  # Hyperlipidemia, not controlled  # Hypertension  # History of CVA    Although the patient's troponin did not increase significantly, his symptoms are potentially ischemic and he has not had an ischemic evaluation since bypass surgery in 2016.  Additionally, he is a good candidate for upgrade to CRT given his known history of cardiomyopathy with an underlying LBBB despite medical therapy and he would require a repeat ischemic evaluation prior to considering CRT.    Recommendations:   -Plan for cardiac catheterization today  -Continue atorvastatin 80 mg nightly  -Continue aspirin 81 mg daily  -Increase carvedilol to 6.25 mg twice daily  -Continue Entresto 50 mg twice daily  -Continue empagliflozin 10 mg daily  -Continue spironolactone 25 mg daily  -Pending results of cardiac catheterization would plan for outpatient evaluation for CRT    Disposition:   Cardiology will continue to follow.  Was unable to have cath yesterday due to scheduling.  Further recommendations will pending findings at time of cardiac catheterization.    History of Present Illness:   Ramon Avery is a 74 year-old gentleman coronary artery disease with prior three-vessel CABG, ischemic cardiomyopathy with LVEF of 35%, chronic left bundle branch block, and paroxysmal atrial fibrillation.  Briefly, he presented 3 days ago to [facility] with right-sided neck pain that radiated into his chest and occurred after working on his tractor.  He had an unremarkable evaluation initially.  However, yesterday, he developed left-sided neck pain that  radiated into his left arm, therefore, he was referred to [facility] for further evaluation.  His initial evaluation showed unremarkable troponins and an ECG with a chronic LBBB, but no obvious ischemic changes.  Cardiology was consulted for further recommendations.    Subjective/Events:   Pleasant, understands need yesterday to move his case to today. Again, denies neck/chest pain this morning.    Medications and Allergies:     Current Facility-Administered Medications   Medication Dose Route Frequency Provider Last Rate Last Admin    pneumococcal 20-Jaqui Conj Vacc (Prevnar 20) syringe 0.5 mL  0.5 mL Intramuscular Once Marysol Durham D.O.        influenza vaccine High-Dose (Fluzone) injection 0.5 mL  0.5 mL Intramuscular Once Marysol Durham D.O.        carvedilol (Coreg) tablet 6.25 mg  6.25 mg Oral BID WITH MEALS Young Olson M.D.        nitroglycerin (Nitrostat) tablet 0.4 mg  0.4 mg Sublingual Q5 MIN PRN Abdullahi Allred M.D.   0.4 mg at 12/02/24 0811    atorvastatin (Lipitor) tablet 80 mg  80 mg Oral Nightly Young Olson M.D.   80 mg at 12/03/24 2044    dapagliflozin propanediol (Farxiga) tablet 10 mg  10 mg Oral DAILY Young Olson M.D.   10 mg at 12/04/24 0441    spironolactone (Aldactone) tablet 25 mg  25 mg Oral Q DAY Young Olson M.D.   25 mg at 12/04/24 0441    sacubitril-valsartan (Entresto) 24-26 MG 1 Tablet  1 Tablet Oral BID Jo Romero, A.P.R.N.   1 Tablet at 12/04/24 0441    regadenoson (Lexiscan) injection SOLN 0.4 mg  0.4 mg Intravenous Once PRN Jo Romero, A.P.R.N.        aminophylline injection 100 mg  100 mg Intravenous Q5 MIN PRN Jo Romero A.P.R.N.        acetaminophen (Tylenol) tablet 650 mg  650 mg Oral Q6HRS PRN Jo Romero A.P.R.N.        aspirin EC tablet 81 mg  81 mg Oral DAILY Jo Romero, A.P.R.N.   81 mg at 12/04/24 0441    labetalol (Normodyne/Trandate) injection 10 mg  10 mg Intravenous Q4HRS PRN Jo Romero,  BETTYE.P.RBRYAN        ondansetron (Zofran) syringe/vial injection 4 mg  4 mg Intravenous Q4HRS PRN TALYA VazquezRBRYAN        ondansetron (Zofran ODT) dispertab 4 mg  4 mg Oral Q4HRS PRN TALYA VazquezRBRYAN        omeprazole (PriLOSEC) capsule 20 mg  20 mg Oral DAILY DEA VazquezP.RBRYAN   20 mg at 12/04/24 0441    mag hydrox-al hydrox-simeth (Maalox Plus Es Or Mylanta Ds) suspension 10 mL  10 mL Oral 4X/DAY PRN TALYA VazquezRBRYAN        cyclobenzaprine (Flexeril) tablet 10 mg  10 mg Oral TID PRN DEA VazquezP.RBRYAN         Allergies   Allergen Reactions    Coolin Swelling     Lip blistering and swelling     Vital Signs:   /55   Pulse (!) 58   Temp 36.5 °C (97.7 °F) (Temporal)   Resp 16   Ht 1.829 m (6')   Wt 119 kg (262 lb 2 oz)   SpO2 90%   BMI 35.55 kg/m²   Vitals:    12/03/24 2352 12/04/24 0446 12/04/24 0714 12/04/24 0859   BP: 111/54 107/55 109/57 114/55   Pulse: (!) 53 (!) 57 (!) 57 (!) 58   Resp: 18 18 18 16   Temp: 36.7 °C (98.1 °F) 36.7 °C (98.1 °F) 36.6 °C (97.9 °F) 36.5 °C (97.7 °F)   TempSrc: Temporal Temporal Temporal Temporal   SpO2: 95% 93% 90%    Weight:  119 kg (262 lb 2 oz)     Height:         Body mass index is 35.55 kg/m².  Oxygen Therapy:  Pulse Oximetry: 90 %, O2 (LPM): 0, O2 Delivery Device: None - Room Air    Physical Examination:   General: Well-appearing, no acute distress  Eyes: Extraocular movements intact, anicteric  HENT: Neck full range of motion, no jugular venous distension  Pulmonary: Normal respiratory effort, no distress  Cardiovascular: Regular rate, regular rhythm  Gastrointestinal: Obese, non-distended  Extremities: Warm and well perfused, no significant lower extremity edema  Neurological: Alert and oriented, no gross focal motor deficits  Psychiatric: Appropriate affect, normal judgment    Laboratories:   Estimated Creatinine Clearance: 115.5 mL/min (by C-G formula based on SCr of 0.77 mg/dL).  Recent Labs      12/01/24  1434 12/02/24  0205 12/02/24  0752 12/04/24  0137   CREATININE 0.85 0.91  --  0.77   BUN 17 22  --  24*   POTASSIUM 4.0 4.2  --  4.4   SODIUM 136 139  --  139   CALCIUM 9.2 9.0  --  9.1   MAGNESIUM  --   --  2.2  --    CO2 22 26  --  22   ALBUMIN 4.4  --   --  3.8     Recent Labs     12/01/24  1434 12/02/24  0205 12/04/24  0137   GLUCOSE 107* 102* 91     Recent Labs     12/01/24  1434   ASTSGOT 12   ALTSGPT 15   ALKPHOSPHAT 62     Recent Labs     12/01/24  1434 12/02/24  0205 12/04/24  0137   WBC 11.8* 10.0 7.8   HEMOGLOBIN 14.6 13.4* 14.0   PLATELETCT 235 221 240     Recent Labs     12/01/24  1434 12/01/24  1725 12/01/24  2156 12/02/24  0205   TROPONINT 20* 19 18  --    NTPROBNP 787*  --   --   --    HBA1C  --   --   --  6.2*     Lab Results   Component Value Date/Time    LDL 98 12/02/2024 0205     Lab Results   Component Value Date/Time    HDL 50 12/02/2024 0205       Lab Results   Component Value Date/Time    TRIGLYCERIDE 56 12/02/2024 0205       Lab Results   Component Value Date/Time    CHOLSTRLTOT 159 12/02/2024 0205     Studies:   Echocardiography  I personally reviewed and interpreted the patient's echocardiogram, which demonstrates moderately reduced left ventricular systolic function with an estimated LVEF of 35-40%, significant septal dyssynchrony consistent with LBBB, no significant valvular abnormalities, and a mild to moderate ascending aortic aneurysm.    X-ray/CT/MRI  I personally reviewed and interpreted the patient's chest x-ray, which demonstrates no acute cardiopulmonary disease.    Electrophysiology  I personally reviewed and interpret the patient's ECG, which demonstrates sinus rhythm with first-degree AV block and a chronic LBBB          Young Olson MD, [facility]  Interventional Cardiology  [facility] Medicine, [address]  [address], [place] [address]  [place] [address]  Phone: [phone]  Fax: [phone]

## 2024-12-04 NOTE — PROGRESS NOTES
Patient had 14 beats of vtach. Primary and cardiologist notified.   Per Dr Olson only notify if vtach is great than 30 beats.

## 2024-12-04 NOTE — PROGRESS NOTES
Tele monitor flow sheet - no image downloaded    A Fib on monitor  HR 40-70's, controlled rate  PVC's noted  LBBB noted, hx LBBB, known by MD    SR/SB reported by tele monitor tech

## 2024-12-04 NOTE — CARE PLAN
The patient is Watcher - Medium risk of patient condition declining or worsening    Shift Goals  Clinical Goals: Remain safe and free from falls, NPO for cath lab today, tele monitoring,  Patient Goals: get procedure completed today  Family Goals: JAQUAN    Progress made toward(s) clinical / shift goals:        Problem: Knowledge Deficit - Standard  Goal: Patient and family/care givers will demonstrate understanding of plan of care, disease process/condition, diagnostic tests and medications  Outcome: Progressing  Note: Patient verbalizes plan of care to go to cath lab today. Patient verbalizes disease process and reason for heart cath. Patient verbalizes understanding to medication and the importance to compliance.      Problem: Safety  Goal: Will remain free from injury  Outcome: Progressing  Note: Patient remains free from falls. Patient is stable on feet. Patient educated on calling for assistance if needed.      Problem: Pain Management  Goal: Pain level will decrease to patient's comfort goal  Outcome: Progressing  Flowsheets (Taken 12/4/2024 0900)  Pain Rating Scale (NPRS): 0  Note: Patient is currently not complaining of pain. Rates pain 0/10     Problem: Medication  Goal: Compliance with prescribed medication will improve  Outcome: Progressing     Problem: Knowledge Deficit  Goal: Knowledge of disease process/condition, treatment plan, diagnostic tests, and medications will improve  Outcome: Progressing       Patient is not progressing towards the following goals:    Patient requiring cath lab services. Patient had multiple episodes of v tach throughout the day

## 2024-12-04 NOTE — PROCEDURES
Cardiac Catheterization and Percutaneous Intervention Procedure Report    12/4/2024    Referring MD:     Primary Care Provider: Mathew Tipton M.D.    Indication for procedure: Unstable angina    Procedures:  Coronary and bypass graft arteriograms  Left heart catheterization  Angioplasty and placement of a 3.0 by 12mm Synergy drug-eluting stent in mid  right coronary artery postdilated to 3.5 mm.    Final diagnosis:   Occluded vein graft to RCA, successful PCI of native RCA.  Patent LIMA, vein graft to marginal branch    Recommendations: Guideline directed medical therapy and risk factor management      Coronary arteriograms:  Left main: luminal irregularities  Left anterior descending: Diffuse moderate disease in midportion after diagonal takeoff.  Apical LAD has competitive flow from LIMA.  Dominant first diagonal artery has focal 60% stenosis in proximal portion.   Left circumflex: Occluded in the proximal portion.  Marginal branch fills through vein graft  Ramus intermedius: Large vessel, mild nonobstructive disease.  Right coronary: 80% stenosis focal in midportion, diffuse mild disease in the proximal portion., dominant  Vein graft to marginal widely patent with excellent runoff.  Vein graft to RCA occluded likely subacute  LIMA to LAD could not selectively engage due to severe subclavian artery tortuosity, nonselective angiogram showed patent LIMA to distal LAD.    Left Heart Catheterization:  Left Ventriculogram: Not performed  Left Ventricular EDP: 7 mm Hg   Aortic Valve Gradient: No significant AV gradient noted    Procedure details:  Ramon Avery was brought to the cardiac catheterization lab where the right groin was prepped and draped in the usual manner for cardiac catheterization.  Timeout was performed.  The area was anesthetized with lidocaine and a 5/6 FR sheath was inserted into the right femoral artery without difficulty. A #4 left Jesusita catheter was advanced to the ostia of  "the Left coronary artery and arteriograms were recorded.   A #4 right Jesusita catheter was advanced to the ostia of the right coronary artery and arteriograms were recorded. Aortic valve was crossed using #4 right Jesusita catheter left heart catheterization was performed.  Patient underwent percutaneous coronary revascularization as outlined below.  JR4 catheter was used to perform bypass graft angiograms.  AR-1 catheter was used to perform nonselective LIMA injection.  At the completion of the case the sheath was removed and hemostasis achieved utilizing an AngioSeal device .  Patient was pain-free and hemodynamically stable at the completion of the case.  There were no apparent complications.    Interventional Procedure:     Given the patient's clinical presentation and coronary anatomy, PCI was indicated and we proceeded with the intervention as detailed below.    Indication for PCI:  Unstable angina    Pre: 80 %, 10 mm length, HUMBERTO 3 flow  Post: 0%, HUMBERTO 3 flow    Lesion complexity  Non-High  Severe calcification No  Bifurcation  No    Guide catheter: AR1 was advanced to the ostia of the right coronary artery.    Guide wire: A 0.014\" mm  Runthrough was advanced into the artery and crossed the lesion.    Stent: A 3.0 by 12 mm Synergy drug-eluting  stent was deployed in mid  right coronary artery at 12 MARIBETH.    Post dilatation is achieved using  3.5 by 12 mm NC Emergeinflated to 14 MARIBETH.    Anticoagulant: Heparin  Antiplatelet: Clopidogrel  EBL <25 cc  Complications: none  Specimens: none  Contrast: 70 cc  Fluorotime : see cath lab flowsheet      Sedation: I supervised moderate sedation over a trained independent observer.    Sedation start time: 2:16  End time: 3:00        Electronically signed by   Peter Hunt M.D., LUANNE  Interventional cardiologist  12/4/2024  3:14 PM          "

## 2024-12-05 ENCOUNTER — APPOINTMENT (OUTPATIENT)
Dept: RADIOLOGY | Facility: MEDICAL CENTER | Age: 74
DRG: 322 | End: 2024-12-05
Payer: MEDICARE

## 2024-12-05 ENCOUNTER — PHARMACY VISIT (OUTPATIENT)
Dept: PHARMACY | Facility: MEDICAL CENTER | Age: 74
End: 2024-12-05
Payer: COMMERCIAL

## 2024-12-05 VITALS
TEMPERATURE: 100.5 F | RESPIRATION RATE: 20 BRPM | SYSTOLIC BLOOD PRESSURE: 104 MMHG | OXYGEN SATURATION: 91 % | DIASTOLIC BLOOD PRESSURE: 54 MMHG | HEART RATE: 67 BPM | HEIGHT: 72 IN | BODY MASS INDEX: 35.5 KG/M2 | WEIGHT: 262.13 LBS

## 2024-12-05 LAB
ALBUMIN SERPL BCP-MCNC: 4 G/DL (ref 3.2–4.9)
ANION GAP SERPL CALC-SCNC: 14 MMOL/L (ref 7–16)
APPEARANCE UR: CLEAR
BACTERIA #/AREA URNS HPF: ABNORMAL /HPF
BILIRUB UR QL STRIP.AUTO: NEGATIVE
BUN SERPL-MCNC: 20 MG/DL (ref 8–22)
CALCIUM ALBUM COR SERPL-MCNC: 9.1 MG/DL (ref 8.5–10.5)
CALCIUM SERPL-MCNC: 9.1 MG/DL (ref 8.5–10.5)
CASTS URNS QL MICRO: ABNORMAL /LPF (ref 0–2)
CHLORIDE SERPL-SCNC: 102 MMOL/L (ref 96–112)
CO2 SERPL-SCNC: 21 MMOL/L (ref 20–33)
COLOR UR: YELLOW
CREAT SERPL-MCNC: 0.79 MG/DL (ref 0.5–1.4)
EPITHELIAL CELLS 1715: ABNORMAL /HPF (ref 0–5)
ERYTHROCYTE [DISTWIDTH] IN BLOOD BY AUTOMATED COUNT: 46.3 FL (ref 35.9–50)
FLUAV RNA SPEC QL NAA+PROBE: NORMAL
FLUBV RNA SPEC QL NAA+PROBE: NORMAL
GFR SERPLBLD CREATININE-BSD FMLA CKD-EPI: 94 ML/MIN/1.73 M 2
GLUCOSE SERPL-MCNC: 81 MG/DL (ref 65–99)
GLUCOSE UR STRIP.AUTO-MCNC: >=1000 MG/DL
HCT VFR BLD AUTO: 45.2 % (ref 42–52)
HGB BLD-MCNC: 15 G/DL (ref 14–18)
KETONES UR STRIP.AUTO-MCNC: 40 MG/DL
LACTATE SERPL-SCNC: 0.9 MMOL/L (ref 0.5–2)
LACTATE SERPL-SCNC: 1.1 MMOL/L (ref 0.5–2)
LACTATE SERPL-SCNC: 2.3 MMOL/L (ref 0.5–2)
LEUKOCYTE ESTERASE UR QL STRIP.AUTO: NEGATIVE
MAGNESIUM SERPL-MCNC: 2.1 MG/DL (ref 1.5–2.5)
MCH RBC QN AUTO: 32.1 PG (ref 27–33)
MCHC RBC AUTO-ENTMCNC: 33.2 G/DL (ref 32.3–36.5)
MCV RBC AUTO: 96.8 FL (ref 81.4–97.8)
MICRO URNS: ABNORMAL
NITRITE UR QL STRIP.AUTO: NEGATIVE
PH UR STRIP.AUTO: 5.5 [PH] (ref 5–8)
PHOSPHATE SERPL-MCNC: 2.6 MG/DL (ref 2.5–4.5)
PLATELET # BLD AUTO: 252 K/UL (ref 164–446)
PMV BLD AUTO: 9.8 FL (ref 9–12.9)
POTASSIUM SERPL-SCNC: 4 MMOL/L (ref 3.6–5.5)
PROCALCITONIN SERPL-MCNC: 0.11 NG/ML
PROT UR QL STRIP: NEGATIVE MG/DL
RBC # BLD AUTO: 4.67 M/UL (ref 4.7–6.1)
RBC # URNS HPF: ABNORMAL /HPF (ref 0–2)
RBC UR QL AUTO: ABNORMAL
RSV RNA SPEC QL NAA+PROBE: NORMAL
SARS-COV-2 RNA RESP QL NAA+PROBE: NORMAL
SODIUM SERPL-SCNC: 137 MMOL/L (ref 135–145)
SP GR UR STRIP.AUTO: 1.03
SPECIMEN SOURCE: NORMAL
UROBILINOGEN UR STRIP.AUTO-MCNC: 1 EU/DL
WBC # BLD AUTO: 8.2 K/UL (ref 4.8–10.8)
WBC #/AREA URNS HPF: ABNORMAL /HPF

## 2024-12-05 PROCEDURE — 85027 COMPLETE CBC AUTOMATED: CPT

## 2024-12-05 PROCEDURE — 97161 PT EVAL LOW COMPLEX 20 MIN: CPT

## 2024-12-05 PROCEDURE — A9270 NON-COVERED ITEM OR SERVICE: HCPCS | Performed by: INTERNAL MEDICINE

## 2024-12-05 PROCEDURE — 80069 RENAL FUNCTION PANEL: CPT

## 2024-12-05 PROCEDURE — 700102 HCHG RX REV CODE 250 W/ 637 OVERRIDE(OP): Performed by: INTERNAL MEDICINE

## 2024-12-05 PROCEDURE — RXMED WILLOW AMBULATORY MEDICATION CHARGE: Performed by: INTERNAL MEDICINE

## 2024-12-05 PROCEDURE — 71045 X-RAY EXAM CHEST 1 VIEW: CPT

## 2024-12-05 PROCEDURE — A9270 NON-COVERED ITEM OR SERVICE: HCPCS

## 2024-12-05 PROCEDURE — 87040 BLOOD CULTURE FOR BACTERIA: CPT | Mod: 91

## 2024-12-05 PROCEDURE — 97535 SELF CARE MNGMENT TRAINING: CPT

## 2024-12-05 PROCEDURE — 0241U HCHG SARS-COV-2 COVID-19 NFCT DS RESP RNA 4 TRGT MIC: CPT

## 2024-12-05 PROCEDURE — 700105 HCHG RX REV CODE 258

## 2024-12-05 PROCEDURE — 81001 URINALYSIS AUTO W/SCOPE: CPT

## 2024-12-05 PROCEDURE — 84145 PROCALCITONIN (PCT): CPT

## 2024-12-05 PROCEDURE — 36415 COLL VENOUS BLD VENIPUNCTURE: CPT

## 2024-12-05 PROCEDURE — 99232 SBSQ HOSP IP/OBS MODERATE 35: CPT | Performed by: INTERNAL MEDICINE

## 2024-12-05 PROCEDURE — 700102 HCHG RX REV CODE 250 W/ 637 OVERRIDE(OP)

## 2024-12-05 PROCEDURE — 83605 ASSAY OF LACTIC ACID: CPT | Mod: 91

## 2024-12-05 PROCEDURE — 83735 ASSAY OF MAGNESIUM: CPT

## 2024-12-05 PROCEDURE — 93005 ELECTROCARDIOGRAM TRACING: CPT | Mod: TC | Performed by: INTERNAL MEDICINE

## 2024-12-05 PROCEDURE — 99239 HOSP IP/OBS DSCHRG MGMT >30: CPT | Performed by: INTERNAL MEDICINE

## 2024-12-05 RX ORDER — SODIUM CHLORIDE 9 MG/ML
1000 INJECTION, SOLUTION INTRAVENOUS
Status: DISCONTINUED | OUTPATIENT
Start: 2024-12-05 | End: 2024-12-05 | Stop reason: HOSPADM

## 2024-12-05 RX ORDER — ACETAMINOPHEN 325 MG/1
325 TABLET ORAL ONCE
Status: COMPLETED | OUTPATIENT
Start: 2024-12-05 | End: 2024-12-05

## 2024-12-05 RX ORDER — ATORVASTATIN CALCIUM 80 MG/1
80 TABLET, FILM COATED ORAL NIGHTLY
Qty: 30 TABLET | Refills: 0 | Status: SHIPPED | OUTPATIENT
Start: 2024-12-05 | End: 2024-12-31 | Stop reason: SDUPTHER

## 2024-12-05 RX ORDER — SODIUM CHLORIDE 9 MG/ML
500 INJECTION, SOLUTION INTRAVENOUS CONTINUOUS
Status: ACTIVE | OUTPATIENT
Start: 2024-12-05 | End: 2024-12-05

## 2024-12-05 RX ORDER — METOPROLOL SUCCINATE 25 MG/1
50 TABLET, EXTENDED RELEASE ORAL
Status: DISCONTINUED | OUTPATIENT
Start: 2024-12-06 | End: 2024-12-05 | Stop reason: HOSPADM

## 2024-12-05 RX ORDER — ASPIRIN 81 MG/1
81 TABLET ORAL DAILY
Qty: 30 TABLET | Refills: 0 | Status: SHIPPED | OUTPATIENT
Start: 2024-12-06 | End: 2024-12-31

## 2024-12-05 RX ORDER — VALSARTAN 80 MG/1
80 TABLET ORAL EVERY EVENING
Status: DISCONTINUED | OUTPATIENT
Start: 2024-12-05 | End: 2024-12-05 | Stop reason: HOSPADM

## 2024-12-05 RX ORDER — VALSARTAN 80 MG/1
80 TABLET ORAL EVERY EVENING
Qty: 30 TABLET | Refills: 0 | Status: SHIPPED | OUTPATIENT
Start: 2024-12-05 | End: 2024-12-31 | Stop reason: SDUPTHER

## 2024-12-05 RX ORDER — CLOPIDOGREL BISULFATE 75 MG/1
75 TABLET ORAL DAILY
Qty: 30 TABLET | Refills: 0 | Status: SHIPPED | OUTPATIENT
Start: 2024-12-06 | End: 2024-12-31 | Stop reason: SDUPTHER

## 2024-12-05 RX ORDER — SODIUM CHLORIDE 9 MG/ML
500 INJECTION, SOLUTION INTRAVENOUS ONCE
Status: COMPLETED | OUTPATIENT
Start: 2024-12-05 | End: 2024-12-05

## 2024-12-05 RX ORDER — SPIRONOLACTONE 25 MG/1
25 TABLET ORAL DAILY
Qty: 30 TABLET | Refills: 0 | Status: SHIPPED | OUTPATIENT
Start: 2024-12-05 | End: 2024-12-31 | Stop reason: SDUPTHER

## 2024-12-05 RX ORDER — ACETAMINOPHEN 325 MG/1
650 TABLET ORAL EVERY 6 HOURS PRN
COMMUNITY
Start: 2024-12-05

## 2024-12-05 RX ORDER — METOPROLOL SUCCINATE 50 MG/1
50 TABLET, EXTENDED RELEASE ORAL DAILY
Qty: 30 TABLET | Refills: 0 | Status: SHIPPED | OUTPATIENT
Start: 2024-12-06 | End: 2024-12-31 | Stop reason: SDUPTHER

## 2024-12-05 RX ADMIN — CLOPIDOGREL BISULFATE 75 MG: 75 TABLET ORAL at 05:27

## 2024-12-05 RX ADMIN — SODIUM CHLORIDE 500 ML: 9 INJECTION, SOLUTION INTRAVENOUS at 06:45

## 2024-12-05 RX ADMIN — SACUBITRIL AND VALSARTAN 1 TABLET: 24; 26 TABLET, FILM COATED ORAL at 05:26

## 2024-12-05 RX ADMIN — ACETAMINOPHEN 650 MG: 325 TABLET, FILM COATED ORAL at 02:35

## 2024-12-05 RX ADMIN — SPIRONOLACTONE 25 MG: 25 TABLET ORAL at 05:27

## 2024-12-05 RX ADMIN — OMEPRAZOLE 20 MG: 20 CAPSULE, DELAYED RELEASE ORAL at 05:26

## 2024-12-05 RX ADMIN — DAPAGLIFLOZIN 10 MG: 10 TABLET, FILM COATED ORAL at 05:28

## 2024-12-05 RX ADMIN — SODIUM CHLORIDE 500 ML: 9 INJECTION, SOLUTION INTRAVENOUS at 03:07

## 2024-12-05 RX ADMIN — ACETAMINOPHEN 325 MG: 325 TABLET ORAL at 05:27

## 2024-12-05 RX ADMIN — ACETAMINOPHEN 650 MG: 325 TABLET, FILM COATED ORAL at 09:13

## 2024-12-05 RX ADMIN — ASPIRIN 81 MG: 81 TABLET, COATED ORAL at 05:27

## 2024-12-05 ASSESSMENT — COGNITIVE AND FUNCTIONAL STATUS - GENERAL
CLIMB 3 TO 5 STEPS WITH RAILING: A LITTLE
SUGGESTED CMS G CODE MODIFIER MOBILITY: CI
MOBILITY SCORE: 23

## 2024-12-05 ASSESSMENT — GAIT ASSESSMENTS
GAIT LEVEL OF ASSIST: SUPERVISED
DISTANCE (FEET): 60
DEVIATION: BRADYKINETIC

## 2024-12-05 ASSESSMENT — PAIN DESCRIPTION - PAIN TYPE: TYPE: ACUTE PAIN

## 2024-12-05 NOTE — THERAPY
Physical Therapy   Initial Evaluation     Patient Name: Ramon Avery  Age:  74 y.o., Sex:  male  Medical Record #: [id number]  Today's Date: 12/5/2024     Precautions  Precautions: (P) Fall Risk    Assessment  Patient is 74 y.o. male admitted 12/1/2024 with chest pain, SOB     Underwent LHC, angioplasty and placement of stent in mid RCA on 12/4/2024    PMH: CABG x 3 in 2016, paroxysmal atrial fibrillation, left bundle branch block, hypertension, hyperlipidemia, GERD, HFrEF, EF 35-40%     Patient seen for PT evaluation and Cardiac Rehab. Patient seated in the chair, agreeable for the session. Able to demonstrate functional mobility tasks as detailed below. Recommend out-patient cardiac rehab    Patient left AMA     Plan    Physical Therapy Initial Treatment Plan   Duration: (P) Discharge Needs Only    DC Equipment Recommendations: (P) None  Discharge Recommendations: (P) Other - (Out-patient Cardiac Rehab Program)    Objective     12/05/24 1051   Time In/Time Out   Therapy Start Time 1021   Therapy End Time 1051   Total Therapy Time 30   Initial Contact Note    Initial Contact Note Order Received and Verified, Physical Therapy Evaluation in Progress with Full Report to Follow.   Precautions   Precautions Fall Risk   Vitals   Pulse 98   Patient BP Position Sitting  (In the chair)   Blood Pressure  119/61   Pulse Oximetry 91 %   O2 Delivery Device None - Room Air   Vitals Comments Post activity, seated in the chair: 108/60, HR 99, SpO2 91.   Pain   Pain Scales 0 to 10 Scale    Intervention Declines   Prior Living Situation   Prior Services Home-Independent   Housing / Facility 1 Story House   Steps Into Home 1  (4 inch step)   Steps In Home 0   Rail None   Equipment Owned Front-Wheel Walker;Single Point Cane;Scooter;Oxygen   Lives with - Patient's Self Care Capacity Alone and Able to Care For Self   Comments Patient mentioned that he has friends and family that could help him if needed. He lives in [place].   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 He walks about 30 mins everyday with his pet. He also works out in the gym 3x/week. He does have a treadmill in his house.   History of Falls   History of Falls No   Cognition    Speech/ Communication Hard of Hearing  (B/L hearing aids (+))   Level of Consciousness Alert   Other Treatments   Other Treatments Provided Patient was provided hand out regarding returning back to activity following cardiac event. Educated on walking program, monitoring vitals, signs and symptoms that would prompt to stop the activity and seek assistance, appropriate temperature & terrain for walking program, talk test, RPE scale, out-patient cardiac rehab program. Patient was given his HR max and parameters of his HR while exercising or ambulating.   Balance Assessment   Standing Balance (Static) Fair   Standing Balance (Dynamic) Fair   Weight Shift Standing Good   Comments Standing without AD   Bed Mobility    Comments Already OOB to chair; patient reported that he is independent with bed mobility   Gait Analysis   Gait Level Of Assist Supervised   Assistive Device None   Distance (Feet) 60   Deviation Bradykinetic   Comments Distance of ambulation limited per request from RN since patient recently had episodes of tachycardia with HR increasing upto 150s. Reinforced to the patient regarding pacing, warm up & cool down phase while performing walking program.   Functional Mobility   Sit to Stand Supervised   Bed, Chair, Wheelchair Transfer Supervised   Transfer Method Stand Step   Mobility Chair-sit-stand-ambulate in the room & hallway-chair   Comments W/O AD   6 Clicks Assessment - How much HELP from from another person do you currently need... (If the patient  hasn't done an activity recently, how much help from another person do you think he/she would need if he/she tried?)   Turning from your back to your side while in a flat bed without using bedrails? 4   Moving from lying on your back to sitting on the side of a flat bed without using bedrails? 4   Moving to and from a bed to a chair (including a wheelchair)? 4   Standing up from a chair using your arms (e.g., wheelchair, or bedside chair)? 4   Walking in hospital room? 4   Climbing 3-5 steps with a railing? 3   6 clicks Mobility Score 23   Activity Tolerance   Sitting in Chair Pre & Post session   Patient / Family Goals    Patient / Family Goal #1 To return home   Education Group   Education Provided Role of Physical Therapist;Cardiac Precautions   Cardiac Precautions Patient Response Patient;Acceptance;Explanation;Demonstration;Handout;Teach Back;Verbal Demonstration;Action Demonstration   Role of Physical Therapist Patient Response Patient;Acceptance;Explanation;Verbal Demonstration   Physical Therapy Initial Treatment Plan    Duration Discharge Needs Only   Anticipated Discharge Equipment and Recommendations   DC Equipment Recommendations None   Discharge Recommendations Other -  (Out-patient Cardiac Rehab Program)   Interdisciplinary Plan of Care Collaboration   IDT Collaboration with  Nursing   Patient Position at End of Therapy Seated;Call Light within Reach;Tray Table within Reach;Other (Comments)  (No chair alarm on PT's arrival)   Session Information   Date / Session Number  12/5-Eval done, DC needs only     Patient on Metoprolol, Reduced EF    HR max: 164-(72*0.7) = 114 (104-124)  Exercise at 70% of HR max

## 2024-12-05 NOTE — DIETARY
Nutrition Services: Cardiac Education Consult   Day 2 of admit.  Ramon Avery is a 74 y.o. male with admitting DX of Chest pain, unstable angina    RD received consult for heart healthy diet education. RD included nutrition recommendations and tips in dietary discharge instructions that align with pt's current dx. Outpatient resources included to encourage follow-up with [facility] for continued support after discharge.     No other education needs identified at this time. Please consult RD as needed for supplemental education or at request of pt.

## 2024-12-05 NOTE — CARE PLAN
The patient is Stable - Low risk of patient condition declining or worsening    Shift Goals  Clinical Goals: Monitor femoral site, manage pain, post op vitals, stress  Patient Goals: rest  Family Goals: isidro    Progress made toward(s) clinical / shift goals:        Problem: Knowledge Deficit - Standard  Goal: Patient and family/care givers will demonstrate understanding of plan of care, disease process/condition, diagnostic tests and medications  Outcome: Progressing  Note: Educated pt on POC. All questions and concerns answered at this moment.     Problem: Safety  Goal: Will remain free from injury  Outcome: Progressing  Note: Educated pt to use call light when needing to mobilize due to recent surgery. Pt demonstrated understanding     Problem: Pain Management  Goal: Pain level will decrease to patient's comfort goal  Outcome: Progressing  Note: Pt has had no complaints of pain

## 2024-12-05 NOTE — PROGRESS NOTES
SB 57-58  Afib 59-68  (R) Coup, (R) PVC  Between 3342-9451: 8 beats of VTACH  Between 3665-9430: 29 beats of VTACH  Between 8851-0072: 12 beats of VTACH    0.24/0.15/0.50

## 2024-12-05 NOTE — PROGRESS NOTES
CARDIOLOGY CLINIC NOTE    Date: 12/5/2024  Consulting Provider: Marysol Durham D.o.    Patient Name: Ramon Avery  YOB: 1950  MRN: [id number]    Assessment:   # Chest pain  # Coronary artery disease, 3V CABG (LIMA-LAD, SVG-OM, SVG-RCA)  # Ischemic cardiomyopathy  # Chronic LBBB  # History of post-operative atrial fibrillation, not currently on chronic anticoagulation  # Hyperlipidemia, not controlled  # Hypertension  # History of CVA  # Fever  # Medical noncompliance due to financial strain    Recommendations:   -Continue atorvastatin 80 mg nightly  -Continue aspirin 81 mg daily  -Continue clopidogrel 75 mg daily for at least 6/12 months if tolerated  -Continue spironolactone 25 mg daily  -Change carvedilol to metoprolol XL 50 mg daily to simplify medical regimen  -Change Entresto to valsartan 80 mg nightly due to cost concerns  -Discontinue dapagliflozin due to cost concerns  -Place cardiac monitor prior to discharge to evaluate for sustained atrial fibrillation  -Plan for outpatient evaluation for CRT    Disposition:   From a Cardiology perspective the patient is appropriate for discharge, however, defer evaluation management of fevers to primary service. We will place a cardiac monitor prior to discharge and arrange for follow-up in Cardiology clinic.    History of Present Illness:   Ramon Avery is a 74 year-old gentleman coronary artery disease with prior three-vessel CABG, ischemic cardiomyopathy with LVEF of 35%, chronic left bundle branch block, and paroxysmal atrial fibrillation.  Briefly, he presented 3 days ago to [facility] with right-sided neck pain that radiated into his chest and occurred after working on his tractor.  He had an unremarkable evaluation initially.  However, yesterday, he developed left-sided neck pain that radiated into his left arm, therefore, he was referred to [facility] for further evaluation.  His initial evaluation showed  unremarkable troponins and an ECG with a chronic LBBB, but no obvious ischemic changes.  Cardiology was consulted for further recommendations.    Subjective/Events:   Underwent PCI to native RCA yesterday.  Overnight, developed symptomatic fever/chills.  Notably, he did receive the flu and Pneumovax vaccines.  Chest x-ray did not show a clear infiltrate and urinalysis is still pending.  Telemetry demonstrated intermittent episodes of what appeared to be sinus tachycardia with frequent atrial and ventricular ectopy, but not clear sustained atrial fibrillation.  Today, the patient notes that he had been off his medications for a while due to many of his medications being unaffordable for him.    Medications and Allergies:     Current Facility-Administered Medications   Medication Dose Route Frequency Provider Last Rate Last Admin    NS (Bolus) 0.9 % infusion 1,000 mL  1,000 mL Intravenous Once PRN Marysol Durham D.O.        pneumococcal 20-Jaqui Conj Vacc (Prevnar 20) syringe 0.5 mL  0.5 mL Intramuscular Once Marysol Durham D.O.        influenza vaccine High-Dose (Fluzone) injection 0.5 mL  0.5 mL Intramuscular Once MATTHIAS SalcidoOPancho        clopidogrel (Plavix) tablet 75 mg  75 mg Oral DAILY Peter Hunt M.D.   75 mg at 12/05/24 0527    carvedilol (Coreg) tablet 6.25 mg  6.25 mg Oral BID WITH MEALS Young Olson M.D.        nitroglycerin (Nitrostat) tablet 0.4 mg  0.4 mg Sublingual Q5 MIN PRN Abdullahi Allred M.D.   0.4 mg at 12/02/24 0811    atorvastatin (Lipitor) tablet 80 mg  80 mg Oral Nightly Young Olson M.D.   80 mg at 12/04/24 2217    dapagliflozin propanediol (Farxiga) tablet 10 mg  10 mg Oral DAILY Young Olson M.D.   10 mg at 12/05/24 0528    spironolactone (Aldactone) tablet 25 mg  25 mg Oral Q DAY Young Olson M.D.   25 mg at 12/05/24 0527    [Held by provider] sacubitril-valsartan (Entresto) 24-26 MG 1 Tablet  1 Tablet Oral BID Jo Romero A.P.R.N.   1 Tablet  at 12/05/24 0526    regadenoson (Lexiscan) injection SOLN 0.4 mg  0.4 mg Intravenous Once PRN BETTYE Vazquez.P.R.ELKIN.        aminophylline injection 100 mg  100 mg Intravenous Q5 MIN PRN BETTYE Vazquez.P.R.N.        acetaminophen (Tylenol) tablet 650 mg  650 mg Oral Q6HRS PRN BETTYE Vazquez.P.R.N.   650 mg at 12/05/24 0235    aspirin EC tablet 81 mg  81 mg Oral DAILY BETTYE Vazquez.P.R.N.   81 mg at 12/05/24 0527    labetalol (Normodyne/Trandate) injection 10 mg  10 mg Intravenous Q4HRS PRN BETTYE Vazquez.P.R.N.        ondansetron (Zofran) syringe/vial injection 4 mg  4 mg Intravenous Q4HRS PRN DEA VazquezP.R.NPancho        ondansetron (Zofran ODT) dispertab 4 mg  4 mg Oral Q4HRS PRN BETTYE Vazquez.P.R.N.        omeprazole (PriLOSEC) capsule 20 mg  20 mg Oral DAILY BETTYE Vazquez.P.R.N.   20 mg at 12/05/24 0526    mag hydrox-al hydrox-simeth (Maalox Plus Es Or Mylanta Ds) suspension 10 mL  10 mL Oral 4X/DAY PRN BETTYE Vazquez.P.R.N.        cyclobenzaprine (Flexeril) tablet 10 mg  10 mg Oral TID PRN BETTYE Vazquez.P.R.N.         Allergies   Allergen Reactions    [place] Swelling     Lip blistering and swelling     Vital Signs:   BP (!) 84/48 Comment: rn notified  Pulse 82   Temp (!) 38.2 °C (100.8 °F) (Oral) Comment: Rn notified  Resp (!) 24 Comment: Rn notified   Ht 1.829 m (6')   Wt 119 kg (262 lb 2 oz)   SpO2 93%   BMI 35.55 kg/m²   Vitals:    12/05/24 0200 12/05/24 0205 12/05/24 0348 12/05/24 0601   BP: 113/63   (!) 84/48   Pulse: 100   82   Resp: (!) 29   (!) 24   Temp: (!) 39 °C (102.2 °F) (!) 38.8 °C (101.8 °F) (!) 38.4 °C (101.2 °F) (!) 38.2 °C (100.8 °F)   TempSrc: Temporal Oral Oral Oral   SpO2: 90%   93%   Weight:       Height:         Body mass index is 35.55 kg/m².  Oxygen Therapy:  Pulse Oximetry: 93 %, O2 (LPM): 3, O2 Delivery Device: None - Room Air    Physical Examination:   General: Well-appearing, no acute  "distress  Eyes: Extraocular movements intact, anicteric  HENT: Neck full range of motion, no jugular venous distension  Pulmonary: Normal respiratory effort, no distress  Cardiovascular: Regular rate, regular rhythm  Gastrointestinal: Obese, non-distended  Extremities: Warm and well perfused, no significant lower extremity edema  Neurological: Alert and oriented, no gross focal motor deficits  Psychiatric: Appropriate affect, normal judgment    Laboratories:   Estimated Creatinine Clearance: 112.6 mL/min (by C-G formula based on SCr of 0.79 mg/dL).  Recent Labs     12/04/24 0137 12/05/24 0142   CREATININE 0.77 0.79   BUN 24* 20   POTASSIUM 4.4 4.0   SODIUM 139 137   CALCIUM 9.1 9.1   MAGNESIUM  --  2.1   CO2 22 21   ALBUMIN 3.8 4.0     Recent Labs     12/04/24 0137 12/05/24 0142   GLUCOSE 91 81     No results for input(s): \"ASTSGOT\", \"ALTSGPT\", \"ALKPHOSPHAT\", \"INR\" in the last 72 hours.    Invalid input(s): \"BILI\"    Recent Labs     12/04/24 0137 12/05/24 0142   WBC 7.8 8.2   HEMOGLOBIN 14.0 15.0   PLATELETCT 240 252     No results for input(s): \"TROPONINT\", \"NTPROBNP\", \"HBA1C\" in the last 72 hours.    Lab Results   Component Value Date/Time    LDL 98 12/02/2024 0205     Lab Results   Component Value Date/Time    HDL 50 12/02/2024 0205       Lab Results   Component Value Date/Time    TRIGLYCERIDE 56 12/02/2024 0205       Lab Results   Component Value Date/Time    CHOLSTRLTOT 159 12/02/2024 0205     Studies:   Echocardiography  I personally reviewed and interpreted the patient's echocardiogram, which demonstrates moderately reduced left ventricular systolic function with an estimated LVEF of 35-40%, significant septal dyssynchrony consistent with LBBB, no significant valvular abnormalities, and a mild to moderate ascending aortic aneurysm.    X-ray/CT/MRI  I personally reviewed and interpreted the patient's chest x-ray, which demonstrates no acute cardiopulmonary disease.    Electrophysiology  I personally " reviewed and interpret the patient's ECG, which demonstrates sinus rhythm with first-degree AV block and a chronic LBBB          Young Olson MD, [facility]  Interventional Cardiology  [facility] Heart and Vascular [facility] Medicine, [facility]  [address] [address]  [place] [address]  Phone: [phone]  Fax: [phone]

## 2024-12-05 NOTE — PROGRESS NOTES
Ramon Avery has chosen to leave the hospital against medical advice. The attending physician has not discharged the patient. The provider is aware that the patient is leaving against medical advice. Patient expresses understanding of the risks of leaving the hospital and benefits of admission including but not limited to, the availability and proximity of nurses, physicians, monitoring, diagnostic testing, treatment, and a safe discharge plan. The patient had the opportunity to ask questions about their medical condition and recommended treatment.  Patient is aware that they may return for care at any time.

## 2024-12-05 NOTE — PROGRESS NOTES
Tele Strip:    Rhythm: SB  Rate: 65-82  Ectopy: sustained a fib, pvc    SD: 0.23  QRS: 0.16  QT: 0.44

## 2024-12-05 NOTE — PROGRESS NOTES
NOC HOSPITALIST CROSS COVER    Notified by RN regarding fever, tachycardia and tachypnea. Infectious workup started, including respiratory virus panel, CXR, IVF, blood cultures, and tylenol      -----------------------------------------------------------------------------------------------------------    Electronically signed by:  MATTHIAS Escalante PA-C  Hospitalist Services

## 2024-12-05 NOTE — DISCHARGE INSTR - DIET
Heart-Healthy Nutrition Therapy    A heart-healthy diet is recommended to reduce your unhealthy blood cholesterol levels, manage high blood pressure, and lower your risk for heart disease.    To follow a heart-healthy diet,    Eat a balanced diet with whole grains, fruits and vegetables, and lean protein sources.  Achieve and maintain a healthy weight.  Choose heart-healthy unsaturated fats. Limit saturated fats, trans fats, and cholesterol intake. Eat more plant-based or vegetarian meals using beans and soy foods for protein.  Eat whole, unprocessed foods to limit the amount of sodium (salt) you eat.  Limit refined carbohydrates especially sugar, sweets and sugar-sweetened beverages.  If you drink alcohol, do so in moderation: one serving per day (women) and two servings per day (men).  One serving is equivalent to 12 ounces beer, 5 ounces wine, or 1.5 ounces distilled spirits    Tips for Choosing Heart-Healthy Fats    Choose lean protein and low-fat dairy foods to reduce saturated fat intake.    Saturated fat is usually found in animal-based protein and is associated with certain health risks. Saturated fat is the biggest contributor to raised low-density lipoprotein (LDL) cholesterol levels in the diet. Research shows that limiting saturated fat lowers unhealthy cholesterol levels. Eat no more than 5-6% of your total calories each day from saturated fat. Ask your registered dietitian nutritionist (RDN) to help you determine how much saturated fat is right for you.  There are many foods that do not contain large amounts of saturated fats. Swapping these foods to replace foods high in saturated fats will help you limit the saturated fat you eat and improve your cholesterol levels. You can also try eating more plant-based or vegetarian meals.        Avoid trans fats    Trans fats increase levels of LDL-cholesterol. Hydrogenated fat in processed foods is the main source of trans fats in foods.   Trans fats can be  found in stick margarine, shortening, processed sweets, baked goods, some fried foods, and packaged foods made with hydrogenated oils. Avoid foods with “partially hydrogenated oil” on the ingredient list such as: cookies, pastries, baked goods, biscuits, crackers, microwave popcorn, and frozen dinners.    Choose foods with heart healthy fats    Polyunsaturated and monounsaturated fat are unsaturated fats that may help lower your blood cholesterol level when used in place of saturated fat in your diet.  Ask your RDN about taking a dietary supplement with plant sterols and stanols to help lower your cholesterol level.  Research shows that substituting saturated fats with unsaturated fats is beneficial to cholesterol levels. Try these easy swaps:      Limit the amount of cholesterol you eat to less than 200 milligrams per day.    Cholesterol is a substance carried through the bloodstream via lipoproteins, which are known as “transporters” of fat. Some body functions need cholesterol to work properly, but too much cholesterol in the bloodstream can damage arteries and build up blood vessel linings (which can lead to heart attack and stroke). You should eat less than 200 milligrams cholesterol per day.  People respond differently to eating cholesterol. There is no test available right now that can figure out which people will respond more to dietary cholesterol and which will respond less. For individuals with high intake of dietary cholesterol, different types of increase (none, small, moderate, large) in LDL-cholesterol levels are all possible.    Food sources of cholesterol include egg yolks and organ meats such as liver, gizzards.  Limit egg yolks to two to four per week and avoid organ meats like liver and gizzards to control cholesterol intake.    Tips for Choosing Heart-Healthy Carbohydrates    Consume foods rich in viscous (soluble) fiber    Viscous, or soluble, fiber is found in the walls of plant cells. Viscous  fiber is found only in plant-based foods--animal-based foods like meat or dairy products do not contain fiber. In the stomach, viscous fibers absorb water and swell to form a thick, jelly-like mass. This helps to lower your unhealthy cholesterol  Rich sources of viscous fiber include asparagus, [place] sprouts, sweet potatoes, turnips, apricots, mangoes, oranges, legumes, barley, oats, and oat bran.  Eat at least 5 to 10 grams of viscous fiber each day. As you increase your fiber intake gradually, also increase the amount of water you drink. This will help prevent constipation.  If you have difficulty achieving this goal, ask your RDN about fiber laxatives. Choose fiber supplements made with viscous fibers such as psyllium seed husks or methylcellulose to help lower unhealthy cholesterol.    Limit refined carbohydrates    There are three types of carbohydrates: starches, sugar, and fiber. Some carbohydrates occur naturally in food, like the starches in rice or corn or the sugars in fruits and milk. Refined carbohydrates--foods with high amounts of simple sugars--can raise triglyceride levels. High triglyceride levels are associated with coronary heart disease.  Some examples of refined carbohydrate foods are table sugar, sweets, and beverages sweetened with added sugar.    Tips for Reducing Sodium (Salt)  Although sodium is important for your body to function, too much sodium can be harmful for people with high blood pressure.  As sodium and fluid buildup in your tissues and bloodstream, your blood pressure increases. High blood pressure may cause damage to other organs and increase your risk for a stroke.    Keep your salt intake to 2300 milligrams or less per day. Even if you take a pill for blood pressure or a water pill (diuretic) to remove fluid, it is still important to have less salt in your diet. Ask your RDN what amount of sodium is right for you.    Avoid processed foods.  Eat more fresh foods.  Fresh  "fruits and vegetables are naturally low in sodium, as well as frozen vegetables and fruits that have no added juices or sauces.  Fresh meats are lower in sodium than processed meats, such as cai, sausage, and hotdogs.  Read the nutrition label or ask your  to help you find a fresh meat that is low in sodium.  Eat less salt--at the table and when cooking.  A single teaspoon of table salt has 2,300 mg of sodium.  Leave the salt out of recipes for pasta, casseroles, and soups.  Ask your RDN how to cook your favorite recipes without sodium  Be a smart .  Look for food packages that say “salt-free” or “sodium-free.” These items contain less than 5 milligrams of sodium per serving.  “Very low-sodium” products contain less than 35 milligrams of sodium per serving.  “Low-sodium” products contain less than 140 milligrams of sodium per serving.   Beware of “reduced salt” or \"reduced sodium\" products.  These items may still be high in sodium. Check the nutrition label.   Add flavors to your food without adding sodium.  Try lemon juice, lime juice, fruit juice or vinegar.    Dry or fresh herbs add flavor. Try basil, bay leaf, dill, rosemary, parsley, carolyn, dry mustard, nutmeg, thyme, and paprika.  Pepper, red pepper flakes, and cayenne pepper can add spice to your meals without adding sodium. Hot sauce contains sodium, but if you use just a drop or two, it will not add up to much.  Buy a sodium-free seasoning blend or make your own at home.     Additional Lifestyle Tips    Achieve and maintain a healthy weight.  Talk with your RDN or your doctor about what is a healthy weight for you.  Set goals to reach and maintain that weight.   To lose weight, reduce your calorie intake along with increasing your physical activity. A weight loss of 10 to 15 pounds could reduce LDL-cholesterol by 5 milligrams per deciliter.  Participate in physical activity.    Talk with your health care team to find out what types of " physical activity are best for you. Set a plan to get about 30 minutes of exercise on most days.          Nutrition Counseling  Our expert team offers:   Medical Nutrition Therapy for Chronic Conditions   Weight Management   Diabetes Education and Management   Wellness Services   Body Composition Measurements   Gastrointestinal Health    [facility] are located at:  [address], [place] [address]  For more information and to schedule a consultation, please call [phone].  A physician referral may be required by your insurance for coverage.

## 2024-12-05 NOTE — PROGRESS NOTES
4 Eyes Skin Assessment Completed by FRAN Hernandes and FRAN Short.    Head WDL  Ears WDL  Nose WDL  Mouth WDL  Neck WDL  Breast/Chest WDL  Shoulder Blades WDL  Spine WDL  (R) Arm/Elbow/Hand WDL  (L) Arm/Elbow/Hand WDL  Abdomen WDL  Groin Redness, femoral site  Scrotum/Coccyx/Buttocks WDL  (R) Leg Scar and Bruising  (L) Leg WDL  (R) Heel/Foot/Toe WDL  (L) Heel/Foot/Toe WDL          Devices In Places Tele Box, Pulse Ox, and Nasal Cannula      Interventions In Place Heels Loaded W/Pillows    Possible Skin Injury No    Pictures Uploaded Into Epic Yes  Wound Consult Placed N/A  RN Wound Prevention Protocol Ordered No

## 2024-12-05 NOTE — DISCHARGE SUMMARY
Discharge Summary    CHIEF COMPLAINT ON ADMISSION  Chief Complaint   Patient presents with    Chest Pain     Pt c/o pain that started in his neck and moved down to his chest.  Pt states the pain was so severe that he was short of breath.  Pt was seen at [facility] yesterday and they ruled out blood clots and heart attack.  Pt was diagnosed with pneumonia and prescribed antibiotics.  Pt states he is still having chest pain and shortness of breath so he came here.         Reason for Admission  chest pain     Admission Date  12/1/2024    CODE STATUS  Full Code    HPI & HOSPITAL COURSE  This is a 74 y.o. male here with chest pain.    74 y.o. male who presented 12/1/2024 with chest pain. Is a 74 male who presents to the emergency department complaints of chest pain.  He hss a past medical history of CABG x 3 in 2016, paroxysmal atrial fibrillation, left bundle branch block, hypertension, hyperlipidemia, GERD.     He is seen in the ED at [facility] yesterday with complaints of chest pain and shortness of breath.  Workup was complete demonstrating no PE or ACS.  He was diagnosed with pneumonia and discharged with prescription for antibiotics which he has not started. Of note, patient quit taking his medications approximately 3 months ago only restarting day prior to admit. In the emergency department, initial troponin 20, repeat 19.  proBNP 787.  EKG demonstrates atrial fibrillation with known left bundle branch block.  Chest x-ray demonstrates mild cardiomegaly with no consolidations identified.     Patient had recurrent chest pain and cardiology consulted for unstable angina this patient was taken for cardiac cath 12/4 after Eliquis was held.  Patient was found to have an acute graft and occluded vein graft to RCA status post successful PCI.  Cardiology recommended to continue atorvastatin, aspirin, Plavix, Aldactone.  Change Coreg to metoprolol XL 50 mg daily.  Change Entresto to valsartan and discontinued to pack with fluids  and due to cost concerns.  Patient had a history of postoperative atrial fibrillation was not on anticoagulation as he was unable to afford his medications.  Some concern for A-fib postcardiac cath.  Discussed with cardiology recommended to discharge on aspirin and Plavix with a Zio patch monitor.  Plan for outpatient CRT.    Post cardiac cath patient developed recurrent fevers.  Chest x-ray and UA negative.  Blood cultures were drawn.  COVID test came back as invalid.,  However he reports he had 1 a few weeks ago and does not want another one.  Patient is alert and oriented x 4 and wants to leave the hospital AGAINST MEDICAL ADVICE.  Discussed risks of leaving with untreated infection and ongoing fevers including worsening infection, sepsis, or death.  Patient stated understanding and would like to discharge AGAINST MEDICAL ADVICE regardless.  I have [facility] cardiac [facility] for [facility] to  prior to leaving.  Cardiology also placed a Zio patch and he is to follow-up closely with them in the office.  I have advised him to come back to the ER for any worsening symptoms.    Therefore, he is discharged in guarded and stable condition against medcial advice.    The patient met 2-midnight criteria for an inpatient stay at the time of discharge.    Discharge Date  12/5/2024    FOLLOW UP ITEMS POST DISCHARGE  Follow-up with primary care and cardiology    DISCHARGE DIAGNOSES  Principal Problem:    Chest pain (POA: Yes)  Active Problems:    Essential hypertension (POA: Yes)    Dyslipidemia (Chronic) (POA: Yes)    S/P CABG x 3 5/2016 Dr Ryder for 3 vessel disease including left main aneurysm (Chronic) (POA: Yes)    Paroxysmal atrial fibrillation - postoperative from CABG and then on event monitor 3/2019 (Chronic) (POA: Yes)    HFrEF (heart failure with reduced ejection fraction) (HCC) (POA: Yes)      Overview: Last Assessment & Plan:       Formatting of this note might be different from the  original.      No record of this on limited outside records that are available from his       care in [place].  Known history of CAD status post CABG x3 in 2016.  No       chest pain, shortness of breath, leg swelling.  Does not appear acutely       decompensated. Discussed with cardiology, will start goal directed therapy       with losartan, avoiding beta blocker given bradycardia. Will need       angiogram at later date. Intends to return to [place] within the next       month.  Willing to follow-up with his old cardiologist there.    Unstable angina (HCC) (POA: Yes)  Resolved Problems:    GERD (gastroesophageal reflux disease) (Chronic) (POA: Yes)    Type 2 diabetes mellitus without complication, without long-term current use of insulin (HCC) (POA: Yes)      Overview: Formatting of this note might be different from the original.      Formatting of this note might be different from the original.      Formatting of this note might be different from the original.            Last Assessment & Plan:       Formatting of this note might be different from the original.      Previous history of this, not on current pharmacotherapy.  Hemoglobin A1c       of 5.8.      FOLLOW UP  No future appointments.  No follow-up provider specified.    MEDICATIONS ON DISCHARGE     Medication List        START taking these medications        Instructions   acetaminophen 325 MG Tabs  Commonly known as: Tylenol   Take 2 Tablets by mouth every 6 hours as needed for Mild Pain, Moderate Pain or Fever.  Dose: 650 mg     aspirin 81 MG EC tablet  Start taking on: December 6, 2024   Take 1 Tablet by mouth every day. Indications: Acute Heart Attack  Dose: 81 mg     clopidogrel 75 MG Tabs  Start taking on: December 6, 2024  Commonly known as: Plavix   Take 1 Tablet by mouth every day.  Dose: 75 mg     metoprolol SR 50 MG Tb24  Start taking on: December 6, 2024  Commonly known as: Toprol XL   Take 1 Tablet by mouth every day.  Dose: 50 mg    "  valsartan 80 MG Tabs  Commonly known as: Diovan   Take 1 Tablet by mouth every evening.  Dose: 80 mg            CHANGE how you take these medications        Instructions   atorvastatin 80 MG tablet  What changed:   medication strength  how much to take  Another medication with the same name was removed. Continue taking this medication, and follow the directions you see here.  Commonly known as: Lipitor   Take 1 Tablet by mouth every evening.  Dose: 80 mg            CONTINUE taking these medications        Instructions   apixaban 5mg Tabs  Commonly known as: Eliquis   Take 1 Tablet by mouth 2 times a day.  Dose: 5 mg     B COMPLEX PO   Take 1 Capful by mouth every day.  Dose: 1 Capful     MENS MULTIVITAMIN PO   Take 1 Tablet by mouth every day.  Dose: 1 Tablet     NON SPECIFIED   Take 1 Dose by mouth every day. \"Vitamin D3 and vitamin C supplement\"  Dose: 1 Dose     spironolactone 25 MG Tabs  Commonly known as: Aldactone   Take 1 Tablet by mouth every day.  Dose: 25 mg            STOP taking these medications      carvedilol 3.125 MG Tabs  Commonly known as: Coreg     Entresto 24-26 MG Tabs  Generic drug: sacubitril-valsartan     furosemide 20 MG Tabs  Commonly known as: Lasix     Jardiance 10 MG Tabs tablet  Generic drug: Empagliflozin              Allergies  Allergies   Allergen Reactions    [place] Swelling     Lip blistering and swelling       DIET  Orders Placed This Encounter   Procedures    Diet Order Diet: Cardiac     Standing Status:   Standing     Number of Occurrences:   1     Order Specific Question:   Diet:     Answer:   Cardiac [6]       ACTIVITY  As tolerated.  Weight bearing as tolerated    CONSULTATIONS  Cardiology      PROCEDURES  12//24  Coronary and bypass graft a recommended patient get CT of the chest rteriograms  Left heart catheterization  Angioplasty and placement of a 3.0 by 12mm Synergy drug-eluting stent in mid  right coronary artery postdilated to 3.5 mm.    LABORATORY  Lab Results "   Component Value Date    SODIUM 137 12/05/2024    POTASSIUM 4.0 12/05/2024    CHLORIDE 102 12/05/2024    CO2 21 12/05/2024    GLUCOSE 81 12/05/2024    BUN 20 12/05/2024    CREATININE 0.79 12/05/2024        Lab Results   Component Value Date    WBC 8.2 12/05/2024    HEMOGLOBIN 15.0 12/05/2024    HEMATOCRIT 45.2 12/05/2024    PLATELETCT 252 12/05/2024        Total time of the discharge process exceeds 45 minutes.

## 2024-12-06 LAB — EKG IMPRESSION: NORMAL

## 2024-12-06 PROCEDURE — 93010 ELECTROCARDIOGRAM REPORT: CPT | Performed by: INTERNAL MEDICINE

## 2024-12-10 LAB
BACTERIA BLD CULT: NORMAL
BACTERIA BLD CULT: NORMAL
SIGNIFICANT IND 70042: NORMAL
SIGNIFICANT IND 70042: NORMAL
SITE SITE: NORMAL
SITE SITE: NORMAL
SOURCE SOURCE: NORMAL
SOURCE SOURCE: NORMAL

## 2024-12-17 ENCOUNTER — TELEPHONE (OUTPATIENT)
Dept: HEALTH INFORMATION MANAGEMENT | Facility: OTHER | Age: 74
End: 2024-12-17
Payer: MEDICARE

## 2024-12-30 ENCOUNTER — TELEPHONE (OUTPATIENT)
Dept: CARDIOLOGY | Facility: MEDICAL CENTER | Age: 74
End: 2024-12-30
Payer: MEDICARE

## 2024-12-30 DIAGNOSIS — I25.10 CORONARY ARTERY DISEASE DUE TO CALCIFIED CORONARY LESION: Chronic | ICD-10-CM

## 2024-12-30 DIAGNOSIS — Z95.5 STATUS POST PLACEMENT OF STENT IN RIGHT CORONARY ARTERY: Chronic | ICD-10-CM

## 2024-12-30 DIAGNOSIS — I25.84 CORONARY ARTERY DISEASE DUE TO CALCIFIED CORONARY LESION: Chronic | ICD-10-CM

## 2024-12-30 DIAGNOSIS — I63.9 CEREBROVASCULAR ACCIDENT (CVA), UNSPECIFIED MECHANISM (HCC): ICD-10-CM

## 2024-12-30 DIAGNOSIS — I48.0 PAROXYSMAL ATRIAL FIBRILLATION (HCC): Chronic | ICD-10-CM

## 2024-12-30 DIAGNOSIS — I25.5 ISCHEMIC CARDIOMYOPATHY: ICD-10-CM

## 2024-12-30 DIAGNOSIS — E78.5 DYSLIPIDEMIA: Chronic | ICD-10-CM

## 2024-12-30 NOTE — TELEPHONE ENCOUNTER
LILLIE EOS to RT's nurse, Cristina, on 12/30/2024    Preliminary findings:    7 episodes of VT  with an avg rate of 140 bpm    1762 episodes of SVT  with an avg rate of 101 bpm    Sinus Rhythm 40-85 with an avg rate of 68 bpm  BBB/IVCD present    Isolated SVE(s) were occasional (1.6%), SVE couplets and triplets were rare    Ventricular ectopics were rare    Ventricular trigeminy was noted    1 patient event associated with:  SR 95  SVE(s)

## 2024-12-31 PROBLEM — Z95.5 STATUS POST PLACEMENT OF STENT IN RIGHT CORONARY ARTERY: Chronic | Status: ACTIVE | Noted: 2024-12-04

## 2024-12-31 RX ORDER — METOPROLOL SUCCINATE 50 MG/1
50 TABLET, EXTENDED RELEASE ORAL DAILY
Qty: 90 TABLET | Refills: 3 | Status: SHIPPED | OUTPATIENT
Start: 2024-12-31

## 2024-12-31 RX ORDER — VALSARTAN 80 MG/1
80 TABLET ORAL EVERY EVENING
Qty: 90 TABLET | Refills: 3 | Status: SHIPPED | OUTPATIENT
Start: 2024-12-31

## 2024-12-31 RX ORDER — SPIRONOLACTONE 25 MG/1
25 TABLET ORAL DAILY
Qty: 90 TABLET | Refills: 3 | Status: SHIPPED | OUTPATIENT
Start: 2024-12-31

## 2024-12-31 RX ORDER — EZETIMIBE 10 MG/1
10 TABLET ORAL DAILY
Qty: 90 TABLET | Refills: 3 | Status: SHIPPED | OUTPATIENT
Start: 2024-12-31

## 2024-12-31 RX ORDER — CLOPIDOGREL BISULFATE 75 MG/1
75 TABLET ORAL DAILY
Qty: 90 TABLET | Refills: 3 | Status: SHIPPED | OUTPATIENT
Start: 2024-12-31

## 2024-12-31 RX ORDER — ATORVASTATIN CALCIUM 80 MG/1
80 TABLET, FILM COATED ORAL NIGHTLY
Qty: 90 TABLET | Refills: 3 | Status: SHIPPED | OUTPATIENT
Start: 2024-12-31

## 2025-01-01 NOTE — TELEPHONE ENCOUNTER
The holter test looks good, please let him know.  Make sure he is taking his medications especially plavix and eliquis. He should stop taking baby aspirin.   I sent presriptions to his pharmacy.  He should also start ezetimibe with his atorvastatin and do a lab test prior to seeing me next available (within 2 months in [place])    Thank you

## 2025-01-01 NOTE — TELEPHONE ENCOUNTER
Called pt son Dany [phone]  to review CW recommendations.  Left message on voicemail to return this call at their earliest convenience.    Upon chart review, pt is active on [employer].  Last login 12/30/2024.  [employer] message sent regarding CW recommendations

## 2025-01-07 ENCOUNTER — PATIENT MESSAGE (OUTPATIENT)
Dept: CARDIOLOGY | Facility: MEDICAL CENTER | Age: 75
End: 2025-01-07
Payer: MEDICARE

## 2025-01-07 NOTE — PATIENT COMMUNICATION
To RX coordinators, please release eliquis prescription to pharmacy, ordered on 12/31/24, thank you!

## 2025-03-03 DIAGNOSIS — I25.84 CORONARY ARTERY DISEASE DUE TO CALCIFIED CORONARY LESION: ICD-10-CM

## 2025-03-03 DIAGNOSIS — I25.10 CORONARY ARTERY DISEASE DUE TO CALCIFIED CORONARY LESION: ICD-10-CM

## 2025-04-25 ENCOUNTER — PATIENT MESSAGE (OUTPATIENT)
Dept: CARDIOLOGY | Facility: MEDICAL CENTER | Age: 75
End: 2025-04-25
Payer: MEDICARE

## 2025-04-29 ENCOUNTER — PATIENT MESSAGE (OUTPATIENT)
Dept: CARDIOLOGY | Facility: MEDICAL CENTER | Age: 75
End: 2025-04-29
Payer: MEDICARE